# Patient Record
Sex: MALE | Race: WHITE | Employment: OTHER | URBAN - METROPOLITAN AREA
[De-identification: names, ages, dates, MRNs, and addresses within clinical notes are randomized per-mention and may not be internally consistent; named-entity substitution may affect disease eponyms.]

---

## 2018-06-04 ENCOUNTER — APPOINTMENT (OUTPATIENT)
Dept: GENERAL RADIOLOGY | Age: 63
DRG: 682 | End: 2018-06-04
Payer: MEDICARE

## 2018-06-04 ENCOUNTER — HOSPITAL ENCOUNTER (INPATIENT)
Age: 63
LOS: 4 days | Discharge: PSYCHIATRIC OR SUBSTANCE USE FACILITY | DRG: 682 | End: 2018-06-08
Attending: EMERGENCY MEDICINE | Admitting: INTERNAL MEDICINE
Payer: MEDICARE

## 2018-06-04 ENCOUNTER — APPOINTMENT (OUTPATIENT)
Dept: CT IMAGING | Age: 63
DRG: 682 | End: 2018-06-04
Payer: MEDICARE

## 2018-06-04 ENCOUNTER — APPOINTMENT (OUTPATIENT)
Dept: ULTRASOUND IMAGING | Age: 63
DRG: 682 | End: 2018-06-04
Payer: MEDICARE

## 2018-06-04 DIAGNOSIS — N17.9 ACUTE KIDNEY INJURY (HCC): ICD-10-CM

## 2018-06-04 DIAGNOSIS — R41.82 ALTERED MENTAL STATUS, UNSPECIFIED ALTERED MENTAL STATUS TYPE: Primary | ICD-10-CM

## 2018-06-04 LAB
-: ABNORMAL
ABSOLUTE EOS #: 0 K/UL (ref 0–0.4)
ABSOLUTE EOS #: 0.1 K/UL (ref 0–0.4)
ABSOLUTE EOS #: 0.23 K/UL (ref 0–0.4)
ABSOLUTE IMMATURE GRANULOCYTE: ABNORMAL K/UL (ref 0–0.3)
ABSOLUTE LYMPH #: 1.73 K/UL (ref 1–4.8)
ABSOLUTE LYMPH #: 2.2 K/UL (ref 1–4.8)
ABSOLUTE LYMPH #: 3.48 K/UL (ref 1–4.8)
ABSOLUTE MONO #: 1.39 K/UL (ref 0.2–0.8)
ABSOLUTE MONO #: 1.4 K/UL (ref 0.2–0.8)
ABSOLUTE MONO #: 2 K/UL (ref 0.2–0.8)
ACETAMINOPHEN LEVEL: <10 UG/ML (ref 10–30)
AMMONIA: 39 UMOL/L (ref 16–60)
AMORPHOUS: ABNORMAL
AMPHETAMINE SCREEN URINE: NEGATIVE
ANION GAP SERPL CALCULATED.3IONS-SCNC: 20 MMOL/L (ref 9–17)
ANION GAP SERPL CALCULATED.3IONS-SCNC: 27 MMOL/L (ref 9–17)
ANION GAP SERPL CALCULATED.3IONS-SCNC: 34 MMOL/L (ref 9–17)
BACTERIA: ABNORMAL
BARBITURATE SCREEN URINE: NEGATIVE
BASOPHILS # BLD: 0 %
BASOPHILS # BLD: 0 % (ref 0–2)
BASOPHILS # BLD: 1 %
BASOPHILS ABSOLUTE: 0 K/UL (ref 0–0.2)
BASOPHILS ABSOLUTE: 0 K/UL (ref 0–0.2)
BASOPHILS ABSOLUTE: 0.13 K/UL (ref 0–0.2)
BENZODIAZEPINE SCREEN, URINE: NEGATIVE
BILIRUBIN URINE: ABNORMAL
BUN BLDV-MCNC: 28 MG/DL (ref 8–23)
BUN BLDV-MCNC: 34 MG/DL (ref 8–23)
BUN BLDV-MCNC: 34 MG/DL (ref 8–23)
BUN/CREAT BLD: 13 (ref 9–20)
BUN/CREAT BLD: 18 (ref 9–20)
BUN/CREAT BLD: 20 (ref 9–20)
BUPRENORPHINE URINE: NORMAL
CALCIUM SERPL-MCNC: 10.2 MG/DL (ref 8.6–10.4)
CALCIUM SERPL-MCNC: 8.8 MG/DL (ref 8.6–10.4)
CALCIUM SERPL-MCNC: 9 MG/DL (ref 8.6–10.4)
CANNABINOID SCREEN URINE: NEGATIVE
CASTS UA: ABNORMAL /LPF
CHLORIDE BLD-SCNC: 86 MMOL/L (ref 98–107)
CHLORIDE BLD-SCNC: 94 MMOL/L (ref 98–107)
CHLORIDE BLD-SCNC: 95 MMOL/L (ref 98–107)
CO2: 17 MMOL/L (ref 20–31)
CO2: 18 MMOL/L (ref 20–31)
CO2: 23 MMOL/L (ref 20–31)
COCAINE METABOLITE, URINE: NEGATIVE
COLOR: ABNORMAL
COMMENT UA: ABNORMAL
CREAT SERPL-MCNC: 1.37 MG/DL (ref 0.7–1.2)
CREAT SERPL-MCNC: 1.86 MG/DL (ref 0.7–1.2)
CREAT SERPL-MCNC: 2.57 MG/DL (ref 0.7–1.2)
CREATININE URINE: 292.4 MG/DL (ref 39–259)
CRYSTALS, UA: ABNORMAL /HPF
DIFFERENTIAL TYPE: ABNORMAL
EKG ATRIAL RATE: 87 BPM
EKG P AXIS: 76 DEGREES
EKG P-R INTERVAL: 152 MS
EKG Q-T INTERVAL: 372 MS
EKG QRS DURATION: 74 MS
EKG QTC CALCULATION (BAZETT): 447 MS
EKG R AXIS: 23 DEGREES
EKG T AXIS: 3 DEGREES
EKG VENTRICULAR RATE: 87 BPM
EOSINOPHILS RELATIVE PERCENT: 0 % (ref 1–4)
EOSINOPHILS RELATIVE PERCENT: 1 % (ref 1–4)
EOSINOPHILS RELATIVE PERCENT: 2 % (ref 1–4)
EPITHELIAL CELLS UA: ABNORMAL /HPF (ref 0–5)
ETHANOL PERCENT: <0.01 %
ETHANOL: <10 MG/DL
GFR AFRICAN AMERICAN: 31 ML/MIN
GFR AFRICAN AMERICAN: 45 ML/MIN
GFR AFRICAN AMERICAN: >60 ML/MIN
GFR NON-AFRICAN AMERICAN: 25 ML/MIN
GFR NON-AFRICAN AMERICAN: 37 ML/MIN
GFR NON-AFRICAN AMERICAN: 52 ML/MIN
GFR SERPL CREATININE-BSD FRML MDRD: ABNORMAL ML/MIN/{1.73_M2}
GLUCOSE BLD-MCNC: 111 MG/DL (ref 70–99)
GLUCOSE BLD-MCNC: 93 MG/DL (ref 70–99)
GLUCOSE BLD-MCNC: 98 MG/DL (ref 70–99)
GLUCOSE URINE: NEGATIVE
HCT VFR BLD CALC: 45.8 % (ref 41–53)
HCT VFR BLD CALC: 46.1 % (ref 41–53)
HCT VFR BLD CALC: 52.9 % (ref 41–53)
HEMOGLOBIN: 14.9 G/DL (ref 13.5–17.5)
HEMOGLOBIN: 15.8 G/DL (ref 13.5–17.5)
HEMOGLOBIN: 17.7 G/DL (ref 13.5–17.5)
IMMATURE GRANULOCYTES: ABNORMAL %
KETONES, URINE: ABNORMAL
LEUKOCYTE ESTERASE, URINE: NEGATIVE
LYMPHOCYTES # BLD: 13 % (ref 24–44)
LYMPHOCYTES # BLD: 26 % (ref 24–44)
LYMPHOCYTES # BLD: 30 % (ref 24–44)
MCH RBC QN AUTO: 30.6 PG (ref 26–34)
MCH RBC QN AUTO: 31.1 PG (ref 26–34)
MCH RBC QN AUTO: 31.8 PG (ref 26–34)
MCHC RBC AUTO-ENTMCNC: 32.5 G/DL (ref 31–37)
MCHC RBC AUTO-ENTMCNC: 33.4 G/DL (ref 31–37)
MCHC RBC AUTO-ENTMCNC: 34.3 G/DL (ref 31–37)
MCV RBC AUTO: 92.8 FL (ref 80–100)
MCV RBC AUTO: 93.2 FL (ref 80–100)
MCV RBC AUTO: 94.1 FL (ref 80–100)
MDMA URINE: NORMAL
METHADONE SCREEN, URINE: NEGATIVE
METHAMPHETAMINE, URINE: NORMAL
MONOCYTES # BLD: 12 % (ref 1–7)
MONOCYTES # BLD: 15 % (ref 1–7)
MONOCYTES # BLD: 16 % (ref 1–7)
MUCUS: ABNORMAL
NITRITE, URINE: NEGATIVE
NRBC AUTOMATED: ABNORMAL PER 100 WBC
OPIATES, URINE: NEGATIVE
OTHER OBSERVATIONS UA: ABNORMAL
OXYCODONE SCREEN URINE: NEGATIVE
PDW BLD-RTO: 12.1 % (ref 11.5–14.5)
PDW BLD-RTO: 12.7 % (ref 11.5–14.5)
PDW BLD-RTO: 13.1 % (ref 11.5–14.5)
PH UA: 6 (ref 5–8)
PHENCYCLIDINE, URINE: NEGATIVE
PLATELET # BLD: 175 K/UL (ref 130–400)
PLATELET # BLD: 187 K/UL (ref 130–400)
PLATELET # BLD: 187 K/UL (ref 130–400)
PLATELET ESTIMATE: ABNORMAL
PMV BLD AUTO: 11.1 FL (ref 6–12)
PMV BLD AUTO: ABNORMAL FL (ref 6–12)
PMV BLD AUTO: ABNORMAL FL (ref 6–12)
POTASSIUM SERPL-SCNC: 3.1 MMOL/L (ref 3.7–5.3)
POTASSIUM SERPL-SCNC: 3.3 MMOL/L (ref 3.7–5.3)
POTASSIUM SERPL-SCNC: 3.6 MMOL/L (ref 3.7–5.3)
PROPOXYPHENE, URINE: NORMAL
PROTEIN UA: ABNORMAL
RBC # BLD: 4.87 M/UL (ref 4.5–5.9)
RBC # BLD: 4.97 M/UL (ref 4.5–5.9)
RBC # BLD: 5.67 M/UL (ref 4.5–5.9)
RBC # BLD: ABNORMAL 10*6/UL
RBC UA: ABNORMAL /HPF (ref 0–2)
RENAL EPITHELIAL, UA: ABNORMAL /HPF
SALICYLATE LEVEL: <1 MG/DL (ref 3–10)
SEG NEUTROPHILS: 56 % (ref 36–66)
SEG NEUTROPHILS: 57 % (ref 36–66)
SEG NEUTROPHILS: 71 % (ref 36–66)
SEGMENTED NEUTROPHILS ABSOLUTE COUNT: 4.8 K/UL (ref 1.8–7.7)
SEGMENTED NEUTROPHILS ABSOLUTE COUNT: 6.5 K/UL (ref 1.8–7.7)
SEGMENTED NEUTROPHILS ABSOLUTE COUNT: 9.44 K/UL (ref 1.8–7.7)
SERUM OSMOLALITY: 293 MOSM/KG (ref 282–298)
SODIUM BLD-SCNC: 137 MMOL/L (ref 135–144)
SODIUM BLD-SCNC: 138 MMOL/L (ref 135–144)
SODIUM BLD-SCNC: 139 MMOL/L (ref 135–144)
SODIUM,UR: 21 MMOL/L
SPECIFIC GRAVITY UA: 1.03 (ref 1–1.03)
TEST INFORMATION: NORMAL
TOXIC TRICYCLIC SC,BLOOD: NEGATIVE
TRICHOMONAS: ABNORMAL
TRICYCLIC ANTIDEPRESSANTS, UR: NORMAL
TURBIDITY: CLEAR
URINE HGB: ABNORMAL
UROBILINOGEN, URINE: NORMAL
WBC # BLD: 11.6 K/UL (ref 3.5–11)
WBC # BLD: 13.3 K/UL (ref 3.5–11)
WBC # BLD: 8.5 K/UL (ref 3.5–11)
WBC # BLD: ABNORMAL 10*3/UL
WBC UA: ABNORMAL /HPF (ref 0–5)
YEAST: ABNORMAL

## 2018-06-04 PROCEDURE — 6360000002 HC RX W HCPCS: Performed by: INTERNAL MEDICINE

## 2018-06-04 PROCEDURE — 2580000003 HC RX 258: Performed by: INTERNAL MEDICINE

## 2018-06-04 PROCEDURE — 80307 DRUG TEST PRSMV CHEM ANLYZR: CPT

## 2018-06-04 PROCEDURE — 2000000000 HC ICU R&B

## 2018-06-04 PROCEDURE — 87086 URINE CULTURE/COLONY COUNT: CPT

## 2018-06-04 PROCEDURE — 2580000003 HC RX 258: Performed by: EMERGENCY MEDICINE

## 2018-06-04 PROCEDURE — 82570 ASSAY OF URINE CREATININE: CPT

## 2018-06-04 PROCEDURE — 36415 COLL VENOUS BLD VENIPUNCTURE: CPT

## 2018-06-04 PROCEDURE — 82140 ASSAY OF AMMONIA: CPT

## 2018-06-04 PROCEDURE — 85025 COMPLETE CBC W/AUTO DIFF WBC: CPT

## 2018-06-04 PROCEDURE — 87040 BLOOD CULTURE FOR BACTERIA: CPT

## 2018-06-04 PROCEDURE — G0480 DRUG TEST DEF 1-7 CLASSES: HCPCS

## 2018-06-04 PROCEDURE — 83930 ASSAY OF BLOOD OSMOLALITY: CPT

## 2018-06-04 PROCEDURE — 99285 EMERGENCY DEPT VISIT HI MDM: CPT

## 2018-06-04 PROCEDURE — 80048 BASIC METABOLIC PNL TOTAL CA: CPT

## 2018-06-04 PROCEDURE — 93005 ELECTROCARDIOGRAM TRACING: CPT

## 2018-06-04 PROCEDURE — 70450 CT HEAD/BRAIN W/O DYE: CPT

## 2018-06-04 PROCEDURE — 84300 ASSAY OF URINE SODIUM: CPT

## 2018-06-04 PROCEDURE — 76770 US EXAM ABDO BACK WALL COMP: CPT

## 2018-06-04 PROCEDURE — 71045 X-RAY EXAM CHEST 1 VIEW: CPT

## 2018-06-04 PROCEDURE — 2500000003 HC RX 250 WO HCPCS: Performed by: SPECIALIST

## 2018-06-04 PROCEDURE — 81001 URINALYSIS AUTO W/SCOPE: CPT

## 2018-06-04 RX ORDER — POTASSIUM BICARBONATE 25 MEQ/1
25 TABLET, EFFERVESCENT ORAL ONCE
Status: DISCONTINUED | OUTPATIENT
Start: 2018-06-04 | End: 2018-06-04

## 2018-06-04 RX ORDER — HEPARIN SODIUM 5000 [USP'U]/ML
5000 INJECTION, SOLUTION INTRAVENOUS; SUBCUTANEOUS EVERY 8 HOURS SCHEDULED
Status: DISCONTINUED | OUTPATIENT
Start: 2018-06-04 | End: 2018-06-08 | Stop reason: HOSPADM

## 2018-06-04 RX ORDER — POTASSIUM CHLORIDE 7.45 MG/ML
10 INJECTION INTRAVENOUS
Status: COMPLETED | OUTPATIENT
Start: 2018-06-04 | End: 2018-06-04

## 2018-06-04 RX ORDER — SODIUM CHLORIDE 9 MG/ML
INJECTION, SOLUTION INTRAVENOUS CONTINUOUS
Status: DISCONTINUED | OUTPATIENT
Start: 2018-06-04 | End: 2018-06-04

## 2018-06-04 RX ORDER — TOPIRAMATE 100 MG/1
300 TABLET, FILM COATED ORAL DAILY
Status: ON HOLD | COMMUNITY
End: 2018-06-08 | Stop reason: HOSPADM

## 2018-06-04 RX ORDER — SODIUM CHLORIDE 0.9 % (FLUSH) 0.9 %
10 SYRINGE (ML) INJECTION EVERY 12 HOURS SCHEDULED
Status: DISCONTINUED | OUTPATIENT
Start: 2018-06-04 | End: 2018-06-08 | Stop reason: HOSPADM

## 2018-06-04 RX ORDER — SODIUM CHLORIDE 0.9 % (FLUSH) 0.9 %
10 SYRINGE (ML) INJECTION PRN
Status: DISCONTINUED | OUTPATIENT
Start: 2018-06-04 | End: 2018-06-08 | Stop reason: HOSPADM

## 2018-06-04 RX ORDER — LORAZEPAM 2 MG/ML
0.5 INJECTION INTRAMUSCULAR EVERY 6 HOURS PRN
Status: DISCONTINUED | OUTPATIENT
Start: 2018-06-04 | End: 2018-06-08 | Stop reason: HOSPADM

## 2018-06-04 RX ORDER — 0.9 % SODIUM CHLORIDE 0.9 %
1000 INTRAVENOUS SOLUTION INTRAVENOUS ONCE
Status: COMPLETED | OUTPATIENT
Start: 2018-06-04 | End: 2018-06-04

## 2018-06-04 RX ADMIN — SODIUM CHLORIDE: 9 INJECTION, SOLUTION INTRAVENOUS at 05:30

## 2018-06-04 RX ADMIN — SODIUM CHLORIDE 1000 ML: 9 INJECTION, SOLUTION INTRAVENOUS at 02:00

## 2018-06-04 RX ADMIN — POTASSIUM CHLORIDE 10 MEQ: 10 INJECTION, SOLUTION INTRAVENOUS at 15:46

## 2018-06-04 RX ADMIN — Medication: at 14:57

## 2018-06-04 RX ADMIN — POTASSIUM CHLORIDE 10 MEQ: 10 INJECTION, SOLUTION INTRAVENOUS at 14:06

## 2018-06-04 RX ADMIN — Medication: at 14:55

## 2018-06-04 RX ADMIN — LORAZEPAM 0.5 MG: 2 INJECTION INTRAMUSCULAR; INTRAVENOUS at 03:53

## 2018-06-05 ENCOUNTER — APPOINTMENT (OUTPATIENT)
Dept: MRI IMAGING | Age: 63
DRG: 682 | End: 2018-06-05
Payer: MEDICARE

## 2018-06-05 PROBLEM — H10.32 ACUTE BACTERIAL CONJUNCTIVITIS OF LEFT EYE: Status: ACTIVE | Noted: 2018-06-05

## 2018-06-05 PROBLEM — E83.39 HYPOPHOSPHATEMIA: Status: ACTIVE | Noted: 2018-06-05

## 2018-06-05 PROBLEM — E87.6 HYPOKALEMIA: Status: ACTIVE | Noted: 2018-06-05

## 2018-06-05 LAB
ABSOLUTE EOS #: 0.1 K/UL (ref 0–0.4)
ABSOLUTE IMMATURE GRANULOCYTE: ABNORMAL K/UL (ref 0–0.3)
ABSOLUTE LYMPH #: 1.8 K/UL (ref 1–4.8)
ABSOLUTE MONO #: 1.4 K/UL (ref 0.2–0.8)
ANION GAP SERPL CALCULATED.3IONS-SCNC: 15 MMOL/L (ref 9–17)
ANION GAP SERPL CALCULATED.3IONS-SCNC: 15 MMOL/L (ref 9–17)
BASOPHILS # BLD: 1 % (ref 0–2)
BASOPHILS ABSOLUTE: 0 K/UL (ref 0–0.2)
BUN BLDV-MCNC: 20 MG/DL (ref 8–23)
BUN/CREAT BLD: 19 (ref 9–20)
CALCIUM SERPL-MCNC: 8.7 MG/DL (ref 8.6–10.4)
CHLORIDE BLD-SCNC: 99 MMOL/L (ref 98–107)
CHLORIDE BLD-SCNC: 99 MMOL/L (ref 98–107)
CO2: 27 MMOL/L (ref 20–31)
CO2: 27 MMOL/L (ref 20–31)
CREAT SERPL-MCNC: 1.05 MG/DL (ref 0.7–1.2)
CULTURE: NO GROWTH
CULTURE: NORMAL
DIFFERENTIAL TYPE: ABNORMAL
EOSINOPHILS RELATIVE PERCENT: 2 % (ref 1–4)
FOLATE: 6.9 NG/ML
GFR AFRICAN AMERICAN: >60 ML/MIN
GFR NON-AFRICAN AMERICAN: >60 ML/MIN
GFR SERPL CREATININE-BSD FRML MDRD: ABNORMAL ML/MIN/{1.73_M2}
GFR SERPL CREATININE-BSD FRML MDRD: ABNORMAL ML/MIN/{1.73_M2}
GLUCOSE BLD-MCNC: 127 MG/DL (ref 70–99)
HCT VFR BLD CALC: 45.5 % (ref 41–53)
HEMOGLOBIN: 15.1 G/DL (ref 13.5–17.5)
IMMATURE GRANULOCYTES: ABNORMAL %
LYMPHOCYTES # BLD: 24 % (ref 24–44)
Lab: NORMAL
MAGNESIUM: 2 MG/DL (ref 1.6–2.6)
MCH RBC QN AUTO: 31.1 PG (ref 26–34)
MCHC RBC AUTO-ENTMCNC: 33.2 G/DL (ref 31–37)
MCV RBC AUTO: 93.6 FL (ref 80–100)
MONOCYTES # BLD: 19 % (ref 1–7)
NRBC AUTOMATED: ABNORMAL PER 100 WBC
PDW BLD-RTO: 12.8 % (ref 11.5–14.5)
PHOSPHORUS: 2 MG/DL (ref 2.5–4.5)
PLATELET # BLD: 162 K/UL (ref 130–400)
PLATELET ESTIMATE: ABNORMAL
PMV BLD AUTO: 12.4 FL (ref 6–12)
POTASSIUM SERPL-SCNC: 3.3 MMOL/L (ref 3.7–5.3)
POTASSIUM SERPL-SCNC: 3.5 MMOL/L (ref 3.7–5.3)
POTASSIUM SERPL-SCNC: 3.8 MMOL/L (ref 3.7–5.3)
RBC # BLD: 4.86 M/UL (ref 4.5–5.9)
RBC # BLD: ABNORMAL 10*6/UL
SEG NEUTROPHILS: 54 % (ref 36–66)
SEGMENTED NEUTROPHILS ABSOLUTE COUNT: 4.2 K/UL (ref 1.8–7.7)
SODIUM BLD-SCNC: 141 MMOL/L (ref 135–144)
SODIUM BLD-SCNC: 141 MMOL/L (ref 135–144)
SPECIMEN DESCRIPTION: NORMAL
SPECIMEN DESCRIPTION: NORMAL
STATUS: NORMAL
TSH SERPL DL<=0.05 MIU/L-ACNC: 2.11 MIU/L (ref 0.3–5)
VITAMIN B-12: 1778 PG/ML (ref 232–1245)
WBC # BLD: 7.5 K/UL (ref 3.5–11)
WBC # BLD: ABNORMAL 10*3/UL

## 2018-06-05 PROCEDURE — 36415 COLL VENOUS BLD VENIPUNCTURE: CPT

## 2018-06-05 PROCEDURE — 6360000002 HC RX W HCPCS: Performed by: INTERNAL MEDICINE

## 2018-06-05 PROCEDURE — 2500000003 HC RX 250 WO HCPCS: Performed by: SPECIALIST

## 2018-06-05 PROCEDURE — 6370000000 HC RX 637 (ALT 250 FOR IP): Performed by: INTERNAL MEDICINE

## 2018-06-05 PROCEDURE — 80051 ELECTROLYTE PANEL: CPT

## 2018-06-05 PROCEDURE — 82607 VITAMIN B-12: CPT

## 2018-06-05 PROCEDURE — 82746 ASSAY OF FOLIC ACID SERUM: CPT

## 2018-06-05 PROCEDURE — 83735 ASSAY OF MAGNESIUM: CPT

## 2018-06-05 PROCEDURE — 84443 ASSAY THYROID STIM HORMONE: CPT

## 2018-06-05 PROCEDURE — 95816 EEG AWAKE AND DROWSY: CPT

## 2018-06-05 PROCEDURE — 84132 ASSAY OF SERUM POTASSIUM: CPT

## 2018-06-05 PROCEDURE — 2000000000 HC ICU R&B

## 2018-06-05 PROCEDURE — 84100 ASSAY OF PHOSPHORUS: CPT

## 2018-06-05 PROCEDURE — 2580000003 HC RX 258: Performed by: INTERNAL MEDICINE

## 2018-06-05 PROCEDURE — 6360000002 HC RX W HCPCS: Performed by: SPECIALIST

## 2018-06-05 PROCEDURE — 85025 COMPLETE CBC W/AUTO DIFF WBC: CPT

## 2018-06-05 PROCEDURE — 80048 BASIC METABOLIC PNL TOTAL CA: CPT

## 2018-06-05 RX ORDER — SENNA AND DOCUSATE SODIUM 50; 8.6 MG/1; MG/1
1 TABLET, FILM COATED ORAL NIGHTLY PRN
Status: ON HOLD | COMMUNITY
End: 2018-06-08 | Stop reason: HOSPADM

## 2018-06-05 RX ORDER — DEXTROSE, SODIUM CHLORIDE, AND POTASSIUM CHLORIDE 5; .45; .15 G/100ML; G/100ML; G/100ML
INJECTION INTRAVENOUS CONTINUOUS
Status: DISCONTINUED | OUTPATIENT
Start: 2018-06-05 | End: 2018-06-08 | Stop reason: HOSPADM

## 2018-06-05 RX ORDER — CIPROFLOXACIN HYDROCHLORIDE 3.5 MG/ML
2 SOLUTION/ DROPS TOPICAL 4 TIMES DAILY
Status: DISCONTINUED | OUTPATIENT
Start: 2018-06-05 | End: 2018-06-08 | Stop reason: HOSPADM

## 2018-06-05 RX ORDER — MELOXICAM 7.5 MG/1
7.5 TABLET ORAL DAILY
Status: ON HOLD | COMMUNITY
End: 2018-06-08 | Stop reason: HOSPADM

## 2018-06-05 RX ORDER — OLANZAPINE 10 MG/1
10 TABLET ORAL NIGHTLY
Status: ON HOLD | COMMUNITY
End: 2018-06-08 | Stop reason: HOSPADM

## 2018-06-05 RX ORDER — POTASSIUM CHLORIDE 7.45 MG/ML
10 INJECTION INTRAVENOUS
Status: COMPLETED | OUTPATIENT
Start: 2018-06-05 | End: 2018-06-05

## 2018-06-05 RX ADMIN — Medication: at 00:42

## 2018-06-05 RX ADMIN — POTASSIUM CHLORIDE 10 MEQ: 10 INJECTION, SOLUTION INTRAVENOUS at 14:48

## 2018-06-05 RX ADMIN — POTASSIUM CHLORIDE 10 MEQ: 10 INJECTION, SOLUTION INTRAVENOUS at 16:10

## 2018-06-05 RX ADMIN — POTASSIUM CHLORIDE 10 MEQ: 10 INJECTION, SOLUTION INTRAVENOUS at 08:55

## 2018-06-05 RX ADMIN — POTASSIUM CHLORIDE 10 MEQ: 10 INJECTION, SOLUTION INTRAVENOUS at 10:11

## 2018-06-05 RX ADMIN — CEFTRIAXONE SODIUM 1 G: 1 INJECTION, POWDER, FOR SOLUTION INTRAMUSCULAR; INTRAVENOUS at 21:33

## 2018-06-05 RX ADMIN — CIPROFLOXACIN HYDROCHLORIDE 2 DROP: 3 SOLUTION/ DROPS OPHTHALMIC at 21:34

## 2018-06-05 RX ADMIN — POTASSIUM CHLORIDE 10 MEQ: 10 INJECTION, SOLUTION INTRAVENOUS at 18:28

## 2018-06-05 RX ADMIN — POTASSIUM CHLORIDE, DEXTROSE MONOHYDRATE AND SODIUM CHLORIDE: 150; 5; 450 INJECTION, SOLUTION INTRAVENOUS at 15:13

## 2018-06-05 RX ADMIN — POTASSIUM CHLORIDE 10 MEQ: 10 INJECTION, SOLUTION INTRAVENOUS at 17:25

## 2018-06-06 LAB
ABSOLUTE EOS #: 0.1 K/UL (ref 0–0.4)
ABSOLUTE IMMATURE GRANULOCYTE: ABNORMAL K/UL (ref 0–0.3)
ABSOLUTE LYMPH #: 2 K/UL (ref 1–4.8)
ABSOLUTE MONO #: 1.7 K/UL (ref 0.2–0.8)
ANION GAP SERPL CALCULATED.3IONS-SCNC: 10 MMOL/L (ref 9–17)
BASOPHILS # BLD: 1 % (ref 0–2)
BASOPHILS ABSOLUTE: 0.1 K/UL (ref 0–0.2)
BUN BLDV-MCNC: 8 MG/DL (ref 8–23)
BUN/CREAT BLD: 9 (ref 9–20)
CALCIUM SERPL-MCNC: 8.8 MG/DL (ref 8.6–10.4)
CHLORIDE BLD-SCNC: 100 MMOL/L (ref 98–107)
CO2: 31 MMOL/L (ref 20–31)
CREAT SERPL-MCNC: 0.89 MG/DL (ref 0.7–1.2)
DIFFERENTIAL TYPE: ABNORMAL
EOSINOPHILS RELATIVE PERCENT: 1 % (ref 1–4)
GFR AFRICAN AMERICAN: >60 ML/MIN
GFR NON-AFRICAN AMERICAN: >60 ML/MIN
GFR SERPL CREATININE-BSD FRML MDRD: ABNORMAL ML/MIN/{1.73_M2}
GFR SERPL CREATININE-BSD FRML MDRD: ABNORMAL ML/MIN/{1.73_M2}
GLUCOSE BLD-MCNC: 120 MG/DL (ref 70–99)
HCT VFR BLD CALC: 47.4 % (ref 41–53)
HEMOGLOBIN: 15.7 G/DL (ref 13.5–17.5)
IMMATURE GRANULOCYTES: ABNORMAL %
LYMPHOCYTES # BLD: 21 % (ref 24–44)
MCH RBC QN AUTO: 31.1 PG (ref 26–34)
MCHC RBC AUTO-ENTMCNC: 33.1 G/DL (ref 31–37)
MCV RBC AUTO: 93.9 FL (ref 80–100)
MONOCYTES # BLD: 19 % (ref 1–7)
NRBC AUTOMATED: ABNORMAL PER 100 WBC
PDW BLD-RTO: 13 % (ref 11.5–14.5)
PLATELET # BLD: 173 K/UL (ref 130–400)
PLATELET ESTIMATE: ABNORMAL
PMV BLD AUTO: 11.3 FL (ref 6–12)
POTASSIUM SERPL-SCNC: 3.7 MMOL/L (ref 3.7–5.3)
RBC # BLD: 5.05 M/UL (ref 4.5–5.9)
RBC # BLD: ABNORMAL 10*6/UL
SEG NEUTROPHILS: 58 % (ref 36–66)
SEGMENTED NEUTROPHILS ABSOLUTE COUNT: 5.5 K/UL (ref 1.8–7.7)
SODIUM BLD-SCNC: 141 MMOL/L (ref 135–144)
WBC # BLD: 9.4 K/UL (ref 3.5–11)
WBC # BLD: ABNORMAL 10*3/UL

## 2018-06-06 PROCEDURE — 6360000002 HC RX W HCPCS: Performed by: INTERNAL MEDICINE

## 2018-06-06 PROCEDURE — 2500000003 HC RX 250 WO HCPCS: Performed by: SPECIALIST

## 2018-06-06 PROCEDURE — 85025 COMPLETE CBC W/AUTO DIFF WBC: CPT

## 2018-06-06 PROCEDURE — 36415 COLL VENOUS BLD VENIPUNCTURE: CPT

## 2018-06-06 PROCEDURE — 80048 BASIC METABOLIC PNL TOTAL CA: CPT

## 2018-06-06 PROCEDURE — 2060000000 HC ICU INTERMEDIATE R&B

## 2018-06-06 PROCEDURE — 2580000003 HC RX 258: Performed by: INTERNAL MEDICINE

## 2018-06-06 RX ADMIN — HEPARIN SODIUM 5000 UNITS: 5000 INJECTION, SOLUTION INTRAVENOUS; SUBCUTANEOUS at 19:19

## 2018-06-06 RX ADMIN — HEPARIN SODIUM 5000 UNITS: 5000 INJECTION, SOLUTION INTRAVENOUS; SUBCUTANEOUS at 21:23

## 2018-06-06 RX ADMIN — POTASSIUM CHLORIDE, DEXTROSE MONOHYDRATE AND SODIUM CHLORIDE: 150; 5; 450 INJECTION, SOLUTION INTRAVENOUS at 05:24

## 2018-06-06 RX ADMIN — CIPROFLOXACIN HYDROCHLORIDE 2 DROP: 3 SOLUTION/ DROPS OPHTHALMIC at 12:00

## 2018-06-06 RX ADMIN — CIPROFLOXACIN HYDROCHLORIDE 2 DROP: 3 SOLUTION/ DROPS OPHTHALMIC at 08:00

## 2018-06-06 RX ADMIN — HEPARIN SODIUM 5000 UNITS: 5000 INJECTION, SOLUTION INTRAVENOUS; SUBCUTANEOUS at 05:50

## 2018-06-06 RX ADMIN — POTASSIUM CHLORIDE, DEXTROSE MONOHYDRATE AND SODIUM CHLORIDE: 150; 5; 450 INJECTION, SOLUTION INTRAVENOUS at 19:40

## 2018-06-06 RX ADMIN — CIPROFLOXACIN HYDROCHLORIDE 2 DROP: 3 SOLUTION/ DROPS OPHTHALMIC at 16:00

## 2018-06-06 RX ADMIN — CIPROFLOXACIN HYDROCHLORIDE 2 DROP: 3 SOLUTION/ DROPS OPHTHALMIC at 21:33

## 2018-06-06 RX ADMIN — CEFTRIAXONE SODIUM 1 G: 1 INJECTION, POWDER, FOR SOLUTION INTRAMUSCULAR; INTRAVENOUS at 21:26

## 2018-06-07 ENCOUNTER — APPOINTMENT (OUTPATIENT)
Dept: GENERAL RADIOLOGY | Age: 63
DRG: 682 | End: 2018-06-07
Payer: MEDICARE

## 2018-06-07 ENCOUNTER — APPOINTMENT (OUTPATIENT)
Dept: MRI IMAGING | Age: 63
DRG: 682 | End: 2018-06-07
Payer: MEDICARE

## 2018-06-07 LAB
ABSOLUTE EOS #: 0.2 K/UL (ref 0–0.4)
ABSOLUTE IMMATURE GRANULOCYTE: ABNORMAL K/UL (ref 0–0.3)
ABSOLUTE LYMPH #: 2.2 K/UL (ref 1–4.8)
ABSOLUTE MONO #: 1.3 K/UL (ref 0.2–0.8)
ANION GAP SERPL CALCULATED.3IONS-SCNC: 12 MMOL/L (ref 9–17)
BASOPHILS # BLD: 0 % (ref 0–2)
BASOPHILS ABSOLUTE: 0 K/UL (ref 0–0.2)
BUN BLDV-MCNC: 5 MG/DL (ref 8–23)
BUN/CREAT BLD: 7 (ref 9–20)
CALCIUM SERPL-MCNC: 9.1 MG/DL (ref 8.6–10.4)
CHLORIDE BLD-SCNC: 99 MMOL/L (ref 98–107)
CO2: 29 MMOL/L (ref 20–31)
CREAT SERPL-MCNC: 0.72 MG/DL (ref 0.7–1.2)
DIFFERENTIAL TYPE: ABNORMAL
EOSINOPHILS RELATIVE PERCENT: 3 % (ref 1–4)
GFR AFRICAN AMERICAN: >60 ML/MIN
GFR NON-AFRICAN AMERICAN: >60 ML/MIN
GFR SERPL CREATININE-BSD FRML MDRD: ABNORMAL ML/MIN/{1.73_M2}
GFR SERPL CREATININE-BSD FRML MDRD: ABNORMAL ML/MIN/{1.73_M2}
GLUCOSE BLD-MCNC: 103 MG/DL (ref 75–110)
GLUCOSE BLD-MCNC: 106 MG/DL (ref 70–99)
HCT VFR BLD CALC: 50.3 % (ref 41–53)
HEMOGLOBIN: 16.5 G/DL (ref 13.5–17.5)
IMMATURE GRANULOCYTES: ABNORMAL %
LYMPHOCYTES # BLD: 31 % (ref 24–44)
MCH RBC QN AUTO: 31 PG (ref 26–34)
MCHC RBC AUTO-ENTMCNC: 32.9 G/DL (ref 31–37)
MCV RBC AUTO: 94.3 FL (ref 80–100)
MONOCYTES # BLD: 18 % (ref 1–7)
NRBC AUTOMATED: ABNORMAL PER 100 WBC
PDW BLD-RTO: 13.1 % (ref 11.5–14.5)
PLATELET # BLD: 172 K/UL (ref 130–400)
PLATELET ESTIMATE: ABNORMAL
PMV BLD AUTO: 11.4 FL (ref 6–12)
POTASSIUM SERPL-SCNC: 3.7 MMOL/L (ref 3.7–5.3)
RBC # BLD: 5.34 M/UL (ref 4.5–5.9)
RBC # BLD: ABNORMAL 10*6/UL
SEG NEUTROPHILS: 48 % (ref 36–66)
SEGMENTED NEUTROPHILS ABSOLUTE COUNT: 3.5 K/UL (ref 1.8–7.7)
SODIUM BLD-SCNC: 140 MMOL/L (ref 135–144)
WBC # BLD: 7.2 K/UL (ref 3.5–11)
WBC # BLD: ABNORMAL 10*3/UL

## 2018-06-07 PROCEDURE — 85025 COMPLETE CBC W/AUTO DIFF WBC: CPT

## 2018-06-07 PROCEDURE — 80048 BASIC METABOLIC PNL TOTAL CA: CPT

## 2018-06-07 PROCEDURE — 2500000003 HC RX 250 WO HCPCS: Performed by: SPECIALIST

## 2018-06-07 PROCEDURE — 82947 ASSAY GLUCOSE BLOOD QUANT: CPT

## 2018-06-07 PROCEDURE — 36415 COLL VENOUS BLD VENIPUNCTURE: CPT

## 2018-06-07 PROCEDURE — 2060000000 HC ICU INTERMEDIATE R&B

## 2018-06-07 PROCEDURE — 6360000004 HC RX CONTRAST MEDICATION: Performed by: PSYCHIATRY & NEUROLOGY

## 2018-06-07 PROCEDURE — 6360000002 HC RX W HCPCS: Performed by: INTERNAL MEDICINE

## 2018-06-07 PROCEDURE — 2580000003 HC RX 258: Performed by: PSYCHIATRY & NEUROLOGY

## 2018-06-07 PROCEDURE — 70553 MRI BRAIN STEM W/O & W/DYE: CPT

## 2018-06-07 PROCEDURE — 2580000003 HC RX 258: Performed by: INTERNAL MEDICINE

## 2018-06-07 PROCEDURE — A9579 GAD-BASE MR CONTRAST NOS,1ML: HCPCS | Performed by: PSYCHIATRY & NEUROLOGY

## 2018-06-07 PROCEDURE — 71045 X-RAY EXAM CHEST 1 VIEW: CPT

## 2018-06-07 RX ORDER — SODIUM CHLORIDE 0.9 % (FLUSH) 0.9 %
10 SYRINGE (ML) INJECTION
Status: COMPLETED | OUTPATIENT
Start: 2018-06-07 | End: 2018-06-07

## 2018-06-07 RX ADMIN — CIPROFLOXACIN HYDROCHLORIDE 2 DROP: 3 SOLUTION/ DROPS OPHTHALMIC at 16:06

## 2018-06-07 RX ADMIN — HEPARIN SODIUM 5000 UNITS: 5000 INJECTION, SOLUTION INTRAVENOUS; SUBCUTANEOUS at 13:18

## 2018-06-07 RX ADMIN — POTASSIUM CHLORIDE, DEXTROSE MONOHYDRATE AND SODIUM CHLORIDE: 150; 5; 450 INJECTION, SOLUTION INTRAVENOUS at 10:07

## 2018-06-07 RX ADMIN — HEPARIN SODIUM 5000 UNITS: 5000 INJECTION, SOLUTION INTRAVENOUS; SUBCUTANEOUS at 05:29

## 2018-06-07 RX ADMIN — HEPARIN SODIUM 5000 UNITS: 5000 INJECTION, SOLUTION INTRAVENOUS; SUBCUTANEOUS at 22:53

## 2018-06-07 RX ADMIN — GADOTERIDOL 19 ML: 279.3 INJECTION, SOLUTION INTRAVENOUS at 08:40

## 2018-06-07 RX ADMIN — Medication 10 ML: at 08:40

## 2018-06-07 RX ADMIN — Medication 10 ML: at 10:07

## 2018-06-07 RX ADMIN — POTASSIUM CHLORIDE, DEXTROSE MONOHYDRATE AND SODIUM CHLORIDE: 150; 5; 450 INJECTION, SOLUTION INTRAVENOUS at 22:53

## 2018-06-07 RX ADMIN — CIPROFLOXACIN HYDROCHLORIDE 2 DROP: 3 SOLUTION/ DROPS OPHTHALMIC at 13:19

## 2018-06-07 RX ADMIN — CIPROFLOXACIN HYDROCHLORIDE 2 DROP: 3 SOLUTION/ DROPS OPHTHALMIC at 08:05

## 2018-06-07 RX ADMIN — CIPROFLOXACIN HYDROCHLORIDE 2 DROP: 3 SOLUTION/ DROPS OPHTHALMIC at 20:58

## 2018-06-07 ASSESSMENT — PAIN SCALES - WONG BAKER
WONGBAKER_NUMERICALRESPONSE: 0

## 2018-06-08 ENCOUNTER — HOSPITAL ENCOUNTER (INPATIENT)
Age: 63
LOS: 5 days | DRG: 885 | End: 2018-06-13
Attending: PSYCHIATRY & NEUROLOGY | Admitting: PSYCHIATRY & NEUROLOGY
Payer: MEDICARE

## 2018-06-08 VITALS
HEIGHT: 72 IN | DIASTOLIC BLOOD PRESSURE: 59 MMHG | RESPIRATION RATE: 16 BRPM | BODY MASS INDEX: 27.05 KG/M2 | WEIGHT: 199.7 LBS | SYSTOLIC BLOOD PRESSURE: 100 MMHG | OXYGEN SATURATION: 97 % | TEMPERATURE: 98.4 F | HEART RATE: 71 BPM

## 2018-06-08 PROBLEM — F31.9 BIPOLAR 1 DISORDER (HCC): Status: RESOLVED | Noted: 2018-06-08 | Resolved: 2018-06-08

## 2018-06-08 PROBLEM — F31.9 BIPOLAR 1 DISORDER (HCC): Status: ACTIVE | Noted: 2018-06-08

## 2018-06-08 PROBLEM — F25.0 SCHIZOAFFECTIVE DISORDER, BIPOLAR TYPE (HCC): Status: ACTIVE | Noted: 2018-06-08

## 2018-06-08 LAB
ABSOLUTE EOS #: 0.2 K/UL (ref 0–0.4)
ABSOLUTE IMMATURE GRANULOCYTE: ABNORMAL K/UL (ref 0–0.3)
ABSOLUTE LYMPH #: 2.8 K/UL (ref 1–4.8)
ABSOLUTE MONO #: 1.3 K/UL (ref 0.2–0.8)
ANION GAP SERPL CALCULATED.3IONS-SCNC: 14 MMOL/L (ref 9–17)
BASOPHILS # BLD: 0 % (ref 0–2)
BASOPHILS ABSOLUTE: 0 K/UL (ref 0–0.2)
BUN BLDV-MCNC: 4 MG/DL (ref 8–23)
BUN/CREAT BLD: 5 (ref 9–20)
CALCIUM SERPL-MCNC: 9 MG/DL (ref 8.6–10.4)
CHLORIDE BLD-SCNC: 98 MMOL/L (ref 98–107)
CO2: 23 MMOL/L (ref 20–31)
CREAT SERPL-MCNC: 0.8 MG/DL (ref 0.7–1.2)
DIFFERENTIAL TYPE: ABNORMAL
EOSINOPHILS RELATIVE PERCENT: 3 % (ref 1–4)
GFR AFRICAN AMERICAN: >60 ML/MIN
GFR NON-AFRICAN AMERICAN: >60 ML/MIN
GFR SERPL CREATININE-BSD FRML MDRD: ABNORMAL ML/MIN/{1.73_M2}
GFR SERPL CREATININE-BSD FRML MDRD: ABNORMAL ML/MIN/{1.73_M2}
GLUCOSE BLD-MCNC: 115 MG/DL (ref 70–99)
HCT VFR BLD CALC: 51.3 % (ref 41–53)
HEMOGLOBIN: 17.1 G/DL (ref 13.5–17.5)
IMMATURE GRANULOCYTES: ABNORMAL %
LYMPHOCYTES # BLD: 37 % (ref 24–44)
MCH RBC QN AUTO: 31.4 PG (ref 26–34)
MCHC RBC AUTO-ENTMCNC: 33.4 G/DL (ref 31–37)
MCV RBC AUTO: 94.2 FL (ref 80–100)
MONOCYTES # BLD: 17 % (ref 1–7)
NRBC AUTOMATED: ABNORMAL PER 100 WBC
PDW BLD-RTO: 13 % (ref 11.5–14.5)
PLATELET # BLD: 193 K/UL (ref 130–400)
PLATELET ESTIMATE: ABNORMAL
PMV BLD AUTO: 10.8 FL (ref 6–12)
POTASSIUM SERPL-SCNC: 4.2 MMOL/L (ref 3.7–5.3)
RBC # BLD: 5.44 M/UL (ref 4.5–5.9)
RBC # BLD: ABNORMAL 10*6/UL
SEG NEUTROPHILS: 43 % (ref 36–66)
SEGMENTED NEUTROPHILS ABSOLUTE COUNT: 3.2 K/UL (ref 1.8–7.7)
SODIUM BLD-SCNC: 135 MMOL/L (ref 135–144)
WBC # BLD: 7.5 K/UL (ref 3.5–11)
WBC # BLD: ABNORMAL 10*3/UL

## 2018-06-08 PROCEDURE — 80048 BASIC METABOLIC PNL TOTAL CA: CPT

## 2018-06-08 PROCEDURE — 6370000000 HC RX 637 (ALT 250 FOR IP): Performed by: PSYCHIATRY & NEUROLOGY

## 2018-06-08 PROCEDURE — 36415 COLL VENOUS BLD VENIPUNCTURE: CPT

## 2018-06-08 PROCEDURE — 85025 COMPLETE CBC W/AUTO DIFF WBC: CPT

## 2018-06-08 PROCEDURE — 1240000000 HC EMOTIONAL WELLNESS R&B

## 2018-06-08 PROCEDURE — 6360000002 HC RX W HCPCS: Performed by: INTERNAL MEDICINE

## 2018-06-08 RX ORDER — OLANZAPINE 5 MG/1
5 TABLET, ORALLY DISINTEGRATING ORAL 3 TIMES DAILY PRN
Status: DISCONTINUED | OUTPATIENT
Start: 2018-06-08 | End: 2018-06-13 | Stop reason: HOSPADM

## 2018-06-08 RX ORDER — BENZTROPINE MESYLATE 1 MG/ML
2 INJECTION INTRAMUSCULAR; INTRAVENOUS 2 TIMES DAILY PRN
Status: DISCONTINUED | OUTPATIENT
Start: 2018-06-08 | End: 2018-06-13 | Stop reason: HOSPADM

## 2018-06-08 RX ORDER — ACETAMINOPHEN 325 MG/1
650 TABLET ORAL EVERY 4 HOURS PRN
Status: DISCONTINUED | OUTPATIENT
Start: 2018-06-08 | End: 2018-06-13 | Stop reason: HOSPADM

## 2018-06-08 RX ORDER — HYDROXYZINE HYDROCHLORIDE 25 MG/1
50 TABLET, FILM COATED ORAL 3 TIMES DAILY PRN
Status: DISCONTINUED | OUTPATIENT
Start: 2018-06-08 | End: 2018-06-13 | Stop reason: HOSPADM

## 2018-06-08 RX ORDER — OLANZAPINE 10 MG/1
5 INJECTION, POWDER, LYOPHILIZED, FOR SOLUTION INTRAMUSCULAR 3 TIMES DAILY PRN
Status: DISCONTINUED | OUTPATIENT
Start: 2018-06-08 | End: 2018-06-13 | Stop reason: SDUPTHER

## 2018-06-08 RX ORDER — TRAZODONE HYDROCHLORIDE 50 MG/1
50 TABLET ORAL NIGHTLY PRN
Status: DISCONTINUED | OUTPATIENT
Start: 2018-06-08 | End: 2018-06-13 | Stop reason: HOSPADM

## 2018-06-08 RX ORDER — CIPROFLOXACIN HYDROCHLORIDE 3.5 MG/ML
2 SOLUTION/ DROPS TOPICAL 4 TIMES DAILY
Status: ACTIVE | OUTPATIENT
Start: 2018-06-08 | End: 2018-06-12

## 2018-06-08 RX ORDER — MAGNESIUM HYDROXIDE/ALUMINUM HYDROXICE/SIMETHICONE 120; 1200; 1200 MG/30ML; MG/30ML; MG/30ML
30 SUSPENSION ORAL EVERY 6 HOURS PRN
Status: DISCONTINUED | OUTPATIENT
Start: 2018-06-08 | End: 2018-06-13 | Stop reason: HOSPADM

## 2018-06-08 RX ORDER — NICOTINE 21 MG/24HR
1 PATCH, TRANSDERMAL 24 HOURS TRANSDERMAL DAILY
Status: DISCONTINUED | OUTPATIENT
Start: 2018-06-08 | End: 2018-06-13 | Stop reason: HOSPADM

## 2018-06-08 RX ADMIN — HEPARIN SODIUM 5000 UNITS: 5000 INJECTION, SOLUTION INTRAVENOUS; SUBCUTANEOUS at 05:54

## 2018-06-08 RX ADMIN — OLANZAPINE 5 MG: 5 TABLET, ORALLY DISINTEGRATING ORAL at 19:48

## 2018-06-08 RX ADMIN — LORAZEPAM 0.5 MG: 2 INJECTION INTRAMUSCULAR; INTRAVENOUS at 06:09

## 2018-06-08 ASSESSMENT — PAIN SCALES - GENERAL
PAINLEVEL_OUTOF10: 0
PAINLEVEL_OUTOF10: 0

## 2018-06-08 ASSESSMENT — SLEEP AND FATIGUE QUESTIONNAIRES
DO YOU HAVE DIFFICULTY SLEEPING: NO
DO YOU USE A SLEEP AID: NO

## 2018-06-08 ASSESSMENT — LIFESTYLE VARIABLES: HISTORY_ALCOHOL_USE: NO

## 2018-06-09 LAB
ALBUMIN SERPL-MCNC: 3.3 G/DL (ref 3.5–5.2)
ALBUMIN/GLOBULIN RATIO: ABNORMAL (ref 1–2.5)
ALP BLD-CCNC: 57 U/L (ref 40–129)
ALT SERPL-CCNC: 19 U/L (ref 5–41)
ANION GAP SERPL CALCULATED.3IONS-SCNC: 10 MMOL/L (ref 9–17)
AST SERPL-CCNC: 30 U/L
BILIRUB SERPL-MCNC: 0.85 MG/DL (ref 0.3–1.2)
BUN BLDV-MCNC: 10 MG/DL (ref 8–23)
BUN/CREAT BLD: ABNORMAL (ref 9–20)
CALCIUM SERPL-MCNC: 9.3 MG/DL (ref 8.6–10.4)
CHLORIDE BLD-SCNC: 100 MMOL/L (ref 98–107)
CHOLESTEROL/HDL RATIO: 4.4
CHOLESTEROL: 119 MG/DL
CO2: 27 MMOL/L (ref 20–31)
CREAT SERPL-MCNC: 1.13 MG/DL (ref 0.7–1.2)
GFR AFRICAN AMERICAN: >60 ML/MIN
GFR NON-AFRICAN AMERICAN: >60 ML/MIN
GFR SERPL CREATININE-BSD FRML MDRD: ABNORMAL ML/MIN/{1.73_M2}
GFR SERPL CREATININE-BSD FRML MDRD: ABNORMAL ML/MIN/{1.73_M2}
GLUCOSE BLD-MCNC: 110 MG/DL (ref 70–99)
HDLC SERPL-MCNC: 27 MG/DL
LDL CHOLESTEROL: 60 MG/DL (ref 0–130)
POTASSIUM SERPL-SCNC: 4.2 MMOL/L (ref 3.7–5.3)
SODIUM BLD-SCNC: 137 MMOL/L (ref 135–144)
THYROXINE, FREE: 1.68 NG/DL (ref 0.93–1.7)
TOTAL PROTEIN: 6.3 G/DL (ref 6.4–8.3)
TRIGL SERPL-MCNC: 159 MG/DL
TSH SERPL DL<=0.05 MIU/L-ACNC: 2.52 MIU/L (ref 0.3–5)
VLDLC SERPL CALC-MCNC: ABNORMAL MG/DL (ref 1–30)

## 2018-06-09 PROCEDURE — 36415 COLL VENOUS BLD VENIPUNCTURE: CPT

## 2018-06-09 PROCEDURE — 80053 COMPREHEN METABOLIC PANEL: CPT

## 2018-06-09 PROCEDURE — 83036 HEMOGLOBIN GLYCOSYLATED A1C: CPT

## 2018-06-09 PROCEDURE — 84439 ASSAY OF FREE THYROXINE: CPT

## 2018-06-09 PROCEDURE — 90792 PSYCH DIAG EVAL W/MED SRVCS: CPT | Performed by: PSYCHIATRY & NEUROLOGY

## 2018-06-09 PROCEDURE — 80061 LIPID PANEL: CPT

## 2018-06-09 PROCEDURE — 84443 ASSAY THYROID STIM HORMONE: CPT

## 2018-06-09 PROCEDURE — 1240000000 HC EMOTIONAL WELLNESS R&B

## 2018-06-09 RX ORDER — OLANZAPINE 5 MG/1
5 TABLET, ORALLY DISINTEGRATING ORAL NIGHTLY
Status: DISCONTINUED | OUTPATIENT
Start: 2018-06-09 | End: 2018-06-11

## 2018-06-09 ASSESSMENT — PAIN SCALES - GENERAL: PAINLEVEL_OUTOF10: 0

## 2018-06-10 LAB
CULTURE: NORMAL
ESTIMATED AVERAGE GLUCOSE: 100 MG/DL
HBA1C MFR BLD: 5.1 % (ref 4–6)
Lab: NORMAL
SPECIMEN DESCRIPTION: NORMAL
STATUS: NORMAL
STATUS: NORMAL

## 2018-06-10 PROCEDURE — 6370000000 HC RX 637 (ALT 250 FOR IP): Performed by: PSYCHIATRY & NEUROLOGY

## 2018-06-10 PROCEDURE — 99222 1ST HOSP IP/OBS MODERATE 55: CPT | Performed by: INTERNAL MEDICINE

## 2018-06-10 PROCEDURE — 1240000000 HC EMOTIONAL WELLNESS R&B

## 2018-06-10 RX ADMIN — CIPROFLOXACIN HYDROCHLORIDE 2 DROP: 3.5 SOLUTION/ DROPS TOPICAL at 16:47

## 2018-06-10 RX ADMIN — OLANZAPINE 5 MG: 5 TABLET, ORALLY DISINTEGRATING ORAL at 22:11

## 2018-06-10 RX ADMIN — OLANZAPINE 5 MG: 5 TABLET, ORALLY DISINTEGRATING ORAL at 09:56

## 2018-06-10 RX ADMIN — CIPROFLOXACIN HYDROCHLORIDE 2 DROP: 3.5 SOLUTION/ DROPS TOPICAL at 09:54

## 2018-06-10 RX ADMIN — CIPROFLOXACIN HYDROCHLORIDE 2 DROP: 3.5 SOLUTION/ DROPS TOPICAL at 22:11

## 2018-06-10 ASSESSMENT — PAIN SCALES - GENERAL: PAINLEVEL_OUTOF10: 0

## 2018-06-11 PROCEDURE — 99232 SBSQ HOSP IP/OBS MODERATE 35: CPT | Performed by: NURSE PRACTITIONER

## 2018-06-11 PROCEDURE — 6370000000 HC RX 637 (ALT 250 FOR IP): Performed by: PSYCHIATRY & NEUROLOGY

## 2018-06-11 PROCEDURE — 99231 SBSQ HOSP IP/OBS SF/LOW 25: CPT | Performed by: INTERNAL MEDICINE

## 2018-06-11 PROCEDURE — 1240000000 HC EMOTIONAL WELLNESS R&B

## 2018-06-11 PROCEDURE — 6370000000 HC RX 637 (ALT 250 FOR IP): Performed by: NURSE PRACTITIONER

## 2018-06-11 RX ORDER — OLANZAPINE 10 MG/1
10 TABLET, ORALLY DISINTEGRATING ORAL NIGHTLY
Status: DISCONTINUED | OUTPATIENT
Start: 2018-06-11 | End: 2018-06-13 | Stop reason: HOSPADM

## 2018-06-11 RX ADMIN — CIPROFLOXACIN HYDROCHLORIDE 2 DROP: 3.5 SOLUTION/ DROPS TOPICAL at 09:53

## 2018-06-11 RX ADMIN — CIPROFLOXACIN HYDROCHLORIDE 2 DROP: 3.5 SOLUTION/ DROPS TOPICAL at 21:41

## 2018-06-11 RX ADMIN — CIPROFLOXACIN HYDROCHLORIDE 2 DROP: 3.5 SOLUTION/ DROPS TOPICAL at 18:52

## 2018-06-11 RX ADMIN — OLANZAPINE 10 MG: 10 TABLET, ORALLY DISINTEGRATING ORAL at 21:41

## 2018-06-11 RX ADMIN — TRAZODONE HYDROCHLORIDE 50 MG: 50 TABLET ORAL at 21:41

## 2018-06-11 RX ADMIN — HYDROXYZINE HYDROCHLORIDE 50 MG: 25 TABLET, FILM COATED ORAL at 21:41

## 2018-06-12 PROCEDURE — 99232 SBSQ HOSP IP/OBS MODERATE 35: CPT | Performed by: REGISTERED NURSE

## 2018-06-12 PROCEDURE — 1240000000 HC EMOTIONAL WELLNESS R&B

## 2018-06-12 RX ADMIN — CIPROFLOXACIN HYDROCHLORIDE 2 DROP: 3.5 SOLUTION/ DROPS TOPICAL at 08:29

## 2018-06-12 ASSESSMENT — PAIN SCALES - WONG BAKER: WONGBAKER_NUMERICALRESPONSE: 0

## 2018-06-13 ENCOUNTER — HOSPITAL ENCOUNTER (INPATIENT)
Age: 63
LOS: 1 days | Discharge: NURSING, REHAB OR LONG-TERM CARE | DRG: 683 | End: 2018-06-15
Attending: INTERNAL MEDICINE | Admitting: INTERNAL MEDICINE
Payer: MEDICARE

## 2018-06-13 VITALS
DIASTOLIC BLOOD PRESSURE: 99 MMHG | WEIGHT: 199 LBS | SYSTOLIC BLOOD PRESSURE: 153 MMHG | OXYGEN SATURATION: 97 % | RESPIRATION RATE: 18 BRPM | HEART RATE: 94 BPM | TEMPERATURE: 97.2 F | BODY MASS INDEX: 26.95 KG/M2 | HEIGHT: 72 IN

## 2018-06-13 LAB
ANION GAP SERPL CALCULATED.3IONS-SCNC: 21 MMOL/L (ref 9–17)
BUN BLDV-MCNC: 27 MG/DL (ref 8–23)
BUN/CREAT BLD: ABNORMAL (ref 9–20)
CALCIUM SERPL-MCNC: 9.4 MG/DL (ref 8.6–10.4)
CHLORIDE BLD-SCNC: 98 MMOL/L (ref 98–107)
CO2: 22 MMOL/L (ref 20–31)
CREAT SERPL-MCNC: 1.49 MG/DL (ref 0.7–1.2)
GFR AFRICAN AMERICAN: 58 ML/MIN
GFR NON-AFRICAN AMERICAN: 48 ML/MIN
GFR SERPL CREATININE-BSD FRML MDRD: ABNORMAL ML/MIN/{1.73_M2}
GFR SERPL CREATININE-BSD FRML MDRD: ABNORMAL ML/MIN/{1.73_M2}
GLUCOSE BLD-MCNC: 82 MG/DL (ref 70–99)
POTASSIUM SERPL-SCNC: 4.4 MMOL/L (ref 3.7–5.3)
SODIUM BLD-SCNC: 141 MMOL/L (ref 135–144)

## 2018-06-13 PROCEDURE — 6360000002 HC RX W HCPCS: Performed by: STUDENT IN AN ORGANIZED HEALTH CARE EDUCATION/TRAINING PROGRAM

## 2018-06-13 PROCEDURE — G0378 HOSPITAL OBSERVATION PER HR: HCPCS

## 2018-06-13 PROCEDURE — 99238 HOSP IP/OBS DSCHRG MGMT 30/<: CPT | Performed by: REGISTERED NURSE

## 2018-06-13 PROCEDURE — 36415 COLL VENOUS BLD VENIPUNCTURE: CPT

## 2018-06-13 PROCEDURE — 99231 SBSQ HOSP IP/OBS SF/LOW 25: CPT | Performed by: INTERNAL MEDICINE

## 2018-06-13 PROCEDURE — 80048 BASIC METABOLIC PNL TOTAL CA: CPT

## 2018-06-13 PROCEDURE — 2500000003 HC RX 250 WO HCPCS: Performed by: STUDENT IN AN ORGANIZED HEALTH CARE EDUCATION/TRAINING PROGRAM

## 2018-06-13 PROCEDURE — S0028 INJECTION, FAMOTIDINE, 20 MG: HCPCS | Performed by: STUDENT IN AN ORGANIZED HEALTH CARE EDUCATION/TRAINING PROGRAM

## 2018-06-13 PROCEDURE — 2580000003 HC RX 258: Performed by: STUDENT IN AN ORGANIZED HEALTH CARE EDUCATION/TRAINING PROGRAM

## 2018-06-13 PROCEDURE — 94664 DEMO&/EVAL PT USE INHALER: CPT

## 2018-06-13 PROCEDURE — 99220 PR INITIAL OBSERVATION CARE/DAY 70 MINUTES: CPT | Performed by: INTERNAL MEDICINE

## 2018-06-13 PROCEDURE — 6370000000 HC RX 637 (ALT 250 FOR IP): Performed by: HOSPITALIST

## 2018-06-13 PROCEDURE — 6370000000 HC RX 637 (ALT 250 FOR IP): Performed by: PSYCHIATRY & NEUROLOGY

## 2018-06-13 RX ORDER — ONDANSETRON 2 MG/ML
4 INJECTION INTRAMUSCULAR; INTRAVENOUS EVERY 6 HOURS PRN
Status: DISCONTINUED | OUTPATIENT
Start: 2018-06-13 | End: 2018-06-15 | Stop reason: HOSPADM

## 2018-06-13 RX ORDER — POTASSIUM CHLORIDE 20MEQ/15ML
40 LIQUID (ML) ORAL PRN
Status: DISCONTINUED | OUTPATIENT
Start: 2018-06-13 | End: 2018-06-15 | Stop reason: HOSPADM

## 2018-06-13 RX ORDER — SODIUM CHLORIDE 0.9 % (FLUSH) 0.9 %
10 SYRINGE (ML) INJECTION EVERY 12 HOURS SCHEDULED
Status: DISCONTINUED | OUTPATIENT
Start: 2018-06-13 | End: 2018-06-15 | Stop reason: HOSPADM

## 2018-06-13 RX ORDER — OLANZAPINE 10 MG/1
10 TABLET, ORALLY DISINTEGRATING ORAL NIGHTLY
Status: DISCONTINUED | OUTPATIENT
Start: 2018-06-13 | End: 2018-06-15 | Stop reason: HOSPADM

## 2018-06-13 RX ORDER — DOCUSATE SODIUM 100 MG/1
100 CAPSULE, LIQUID FILLED ORAL 2 TIMES DAILY PRN
Status: DISCONTINUED | OUTPATIENT
Start: 2018-06-13 | End: 2018-06-15 | Stop reason: HOSPADM

## 2018-06-13 RX ORDER — OLANZAPINE 10 MG/1
10 TABLET, ORALLY DISINTEGRATING ORAL NIGHTLY
Qty: 30 TABLET | Refills: 3 | Status: ON HOLD
Start: 2018-06-13 | End: 2022-01-04 | Stop reason: ALTCHOICE

## 2018-06-13 RX ORDER — POTASSIUM CHLORIDE 7.45 MG/ML
10 INJECTION INTRAVENOUS PRN
Status: DISCONTINUED | OUTPATIENT
Start: 2018-06-13 | End: 2018-06-15 | Stop reason: HOSPADM

## 2018-06-13 RX ORDER — HEPARIN SODIUM 5000 [USP'U]/ML
5000 INJECTION, SOLUTION INTRAVENOUS; SUBCUTANEOUS EVERY 8 HOURS SCHEDULED
Status: DISCONTINUED | OUTPATIENT
Start: 2018-06-13 | End: 2018-06-15 | Stop reason: HOSPADM

## 2018-06-13 RX ORDER — SODIUM CHLORIDE 0.9 % (FLUSH) 0.9 %
10 SYRINGE (ML) INJECTION PRN
Status: DISCONTINUED | OUTPATIENT
Start: 2018-06-13 | End: 2018-06-15 | Stop reason: HOSPADM

## 2018-06-13 RX ORDER — SODIUM CHLORIDE 9 MG/ML
INJECTION, SOLUTION INTRAVENOUS CONTINUOUS
Status: DISCONTINUED | OUTPATIENT
Start: 2018-06-13 | End: 2018-06-15 | Stop reason: HOSPADM

## 2018-06-13 RX ORDER — OLANZAPINE 5 MG/1
5 TABLET, ORALLY DISINTEGRATING ORAL 3 TIMES DAILY PRN
Qty: 30 TABLET | Refills: 3 | Status: ON HOLD
Start: 2018-06-13 | End: 2022-01-04 | Stop reason: ALTCHOICE

## 2018-06-13 RX ORDER — OLANZAPINE 5 MG/1
5 TABLET, ORALLY DISINTEGRATING ORAL 3 TIMES DAILY PRN
Status: DISCONTINUED | OUTPATIENT
Start: 2018-06-13 | End: 2018-06-15 | Stop reason: HOSPADM

## 2018-06-13 RX ORDER — POTASSIUM CHLORIDE 20 MEQ/1
40 TABLET, EXTENDED RELEASE ORAL PRN
Status: DISCONTINUED | OUTPATIENT
Start: 2018-06-13 | End: 2018-06-15 | Stop reason: HOSPADM

## 2018-06-13 RX ORDER — OLANZAPINE 10 MG/1
5 INJECTION, POWDER, LYOPHILIZED, FOR SOLUTION INTRAMUSCULAR 3 TIMES DAILY PRN
Status: DISCONTINUED | OUTPATIENT
Start: 2018-06-13 | End: 2018-06-13 | Stop reason: HOSPADM

## 2018-06-13 RX ORDER — LORAZEPAM 1 MG/1
1 TABLET ORAL 3 TIMES DAILY
Status: DISCONTINUED | OUTPATIENT
Start: 2018-06-13 | End: 2018-06-13 | Stop reason: HOSPADM

## 2018-06-13 RX ADMIN — Medication 10 ML: at 20:58

## 2018-06-13 RX ADMIN — FAMOTIDINE 20 MG: 10 INJECTION INTRAVENOUS at 20:57

## 2018-06-13 RX ADMIN — OLANZAPINE 10 MG: 10 TABLET, ORALLY DISINTEGRATING ORAL at 20:58

## 2018-06-13 RX ADMIN — HEPARIN SODIUM 5000 UNITS: 5000 INJECTION, SOLUTION INTRAVENOUS; SUBCUTANEOUS at 20:57

## 2018-06-13 ASSESSMENT — PAIN SCALES - GENERAL: PAINLEVEL_OUTOF10: 0

## 2018-06-14 PROBLEM — N17.9 ACUTE RENAL FAILURE (ARF) (HCC): Status: ACTIVE | Noted: 2018-06-14

## 2018-06-14 LAB
ABSOLUTE EOS #: 0.1 K/UL (ref 0–0.4)
ABSOLUTE IMMATURE GRANULOCYTE: ABNORMAL K/UL (ref 0–0.3)
ABSOLUTE LYMPH #: 3.2 K/UL (ref 1–4.8)
ABSOLUTE MONO #: 1.3 K/UL (ref 0.1–1.3)
ANION GAP SERPL CALCULATED.3IONS-SCNC: 22 MMOL/L (ref 9–17)
BASOPHILS # BLD: 1 % (ref 0–2)
BASOPHILS ABSOLUTE: 0.1 K/UL (ref 0–0.2)
BUN BLDV-MCNC: 23 MG/DL (ref 8–23)
BUN/CREAT BLD: ABNORMAL (ref 9–20)
CALCIUM SERPL-MCNC: 9.2 MG/DL (ref 8.6–10.4)
CHLORIDE BLD-SCNC: 100 MMOL/L (ref 98–107)
CO2: 19 MMOL/L (ref 20–31)
CREAT SERPL-MCNC: 1.26 MG/DL (ref 0.7–1.2)
DIFFERENTIAL TYPE: ABNORMAL
EOSINOPHILS RELATIVE PERCENT: 1 % (ref 0–4)
GFR AFRICAN AMERICAN: >60 ML/MIN
GFR NON-AFRICAN AMERICAN: 58 ML/MIN
GFR SERPL CREATININE-BSD FRML MDRD: ABNORMAL ML/MIN/{1.73_M2}
GFR SERPL CREATININE-BSD FRML MDRD: ABNORMAL ML/MIN/{1.73_M2}
GLUCOSE BLD-MCNC: 83 MG/DL (ref 70–99)
HCT VFR BLD CALC: 47.4 % (ref 41–53)
HEMOGLOBIN: 16.1 G/DL (ref 13.5–17.5)
IMMATURE GRANULOCYTES: ABNORMAL %
LYMPHOCYTES # BLD: 34 % (ref 24–44)
MCH RBC QN AUTO: 31.4 PG (ref 26–34)
MCHC RBC AUTO-ENTMCNC: 34 G/DL (ref 31–37)
MCV RBC AUTO: 92.5 FL (ref 80–100)
MONOCYTES # BLD: 14 % (ref 1–7)
NRBC AUTOMATED: ABNORMAL PER 100 WBC
PDW BLD-RTO: 13.6 % (ref 11.5–14.9)
PLATELET # BLD: 285 K/UL (ref 150–450)
PLATELET ESTIMATE: ABNORMAL
PMV BLD AUTO: 9.9 FL (ref 6–12)
POTASSIUM SERPL-SCNC: 4 MMOL/L (ref 3.7–5.3)
RBC # BLD: 5.13 M/UL (ref 4.5–5.9)
RBC # BLD: ABNORMAL 10*6/UL
SEG NEUTROPHILS: 50 % (ref 36–66)
SEGMENTED NEUTROPHILS ABSOLUTE COUNT: 4.7 K/UL (ref 1.3–9.1)
SODIUM BLD-SCNC: 141 MMOL/L (ref 135–144)
WBC # BLD: 9.5 K/UL (ref 3.5–11)
WBC # BLD: ABNORMAL 10*3/UL

## 2018-06-14 PROCEDURE — 80048 BASIC METABOLIC PNL TOTAL CA: CPT

## 2018-06-14 PROCEDURE — G8978 MOBILITY CURRENT STATUS: HCPCS

## 2018-06-14 PROCEDURE — 2500000003 HC RX 250 WO HCPCS: Performed by: STUDENT IN AN ORGANIZED HEALTH CARE EDUCATION/TRAINING PROGRAM

## 2018-06-14 PROCEDURE — 1200000000 HC SEMI PRIVATE

## 2018-06-14 PROCEDURE — 99232 SBSQ HOSP IP/OBS MODERATE 35: CPT | Performed by: INTERNAL MEDICINE

## 2018-06-14 PROCEDURE — 6360000002 HC RX W HCPCS: Performed by: STUDENT IN AN ORGANIZED HEALTH CARE EDUCATION/TRAINING PROGRAM

## 2018-06-14 PROCEDURE — 6370000000 HC RX 637 (ALT 250 FOR IP): Performed by: HOSPITALIST

## 2018-06-14 PROCEDURE — G8979 MOBILITY GOAL STATUS: HCPCS

## 2018-06-14 PROCEDURE — 2580000003 HC RX 258: Performed by: HOSPITALIST

## 2018-06-14 PROCEDURE — 36415 COLL VENOUS BLD VENIPUNCTURE: CPT

## 2018-06-14 PROCEDURE — S0028 INJECTION, FAMOTIDINE, 20 MG: HCPCS | Performed by: STUDENT IN AN ORGANIZED HEALTH CARE EDUCATION/TRAINING PROGRAM

## 2018-06-14 PROCEDURE — 90792 PSYCH DIAG EVAL W/MED SRVCS: CPT | Performed by: NURSE PRACTITIONER

## 2018-06-14 PROCEDURE — 97162 PT EVAL MOD COMPLEX 30 MIN: CPT

## 2018-06-14 PROCEDURE — 85025 COMPLETE CBC W/AUTO DIFF WBC: CPT

## 2018-06-14 RX ADMIN — OLANZAPINE 10 MG: 10 TABLET, ORALLY DISINTEGRATING ORAL at 21:55

## 2018-06-14 RX ADMIN — HEPARIN SODIUM 5000 UNITS: 5000 INJECTION, SOLUTION INTRAVENOUS; SUBCUTANEOUS at 06:32

## 2018-06-14 RX ADMIN — FAMOTIDINE 20 MG: 10 INJECTION INTRAVENOUS at 09:43

## 2018-06-14 RX ADMIN — SODIUM CHLORIDE: 9 INJECTION, SOLUTION INTRAVENOUS at 21:00

## 2018-06-14 RX ADMIN — SODIUM CHLORIDE: 9 INJECTION, SOLUTION INTRAVENOUS at 03:04

## 2018-06-14 RX ADMIN — OLANZAPINE 5 MG: 5 TABLET, ORALLY DISINTEGRATING ORAL at 14:38

## 2018-06-14 RX ADMIN — SODIUM CHLORIDE: 9 INJECTION, SOLUTION INTRAVENOUS at 12:17

## 2018-06-14 RX ADMIN — FAMOTIDINE 20 MG: 10 INJECTION INTRAVENOUS at 21:55

## 2018-06-14 ASSESSMENT — PAIN SCALES - GENERAL
PAINLEVEL_OUTOF10: 0

## 2018-06-15 VITALS
RESPIRATION RATE: 18 BRPM | WEIGHT: 195.11 LBS | SYSTOLIC BLOOD PRESSURE: 134 MMHG | TEMPERATURE: 98.3 F | DIASTOLIC BLOOD PRESSURE: 83 MMHG | OXYGEN SATURATION: 96 % | HEART RATE: 69 BPM | BODY MASS INDEX: 26.43 KG/M2 | HEIGHT: 72 IN

## 2018-06-15 PROBLEM — E44.1 MILD MALNUTRITION (HCC): Status: ACTIVE | Noted: 2018-06-15

## 2018-06-15 LAB
-: ABNORMAL
ABSOLUTE EOS #: 0 K/UL (ref 0–0.4)
ABSOLUTE IMMATURE GRANULOCYTE: ABNORMAL K/UL (ref 0–0.3)
ABSOLUTE LYMPH #: 2.27 K/UL (ref 1–4.8)
ABSOLUTE MONO #: 0.78 K/UL (ref 0.1–1.3)
AMORPHOUS: ABNORMAL
ANION GAP SERPL CALCULATED.3IONS-SCNC: 18 MMOL/L (ref 9–17)
BACTERIA: ABNORMAL
BASOPHILS # BLD: 0 % (ref 0–2)
BASOPHILS ABSOLUTE: 0 K/UL (ref 0–0.2)
BILIRUBIN URINE: ABNORMAL
BUN BLDV-MCNC: 14 MG/DL (ref 8–23)
BUN/CREAT BLD: ABNORMAL (ref 9–20)
CALCIUM SERPL-MCNC: 8.8 MG/DL (ref 8.6–10.4)
CASTS UA: ABNORMAL /LPF
CHLORIDE BLD-SCNC: 102 MMOL/L (ref 98–107)
CO2: 20 MMOL/L (ref 20–31)
COLOR: YELLOW
COMMENT UA: ABNORMAL
CREAT SERPL-MCNC: 1.04 MG/DL (ref 0.7–1.2)
CRYSTALS, UA: ABNORMAL /HPF
DIFFERENTIAL TYPE: ABNORMAL
EOSINOPHILS RELATIVE PERCENT: 0 % (ref 0–4)
EPITHELIAL CELLS UA: ABNORMAL /HPF
GFR AFRICAN AMERICAN: >60 ML/MIN
GFR NON-AFRICAN AMERICAN: >60 ML/MIN
GFR SERPL CREATININE-BSD FRML MDRD: ABNORMAL ML/MIN/{1.73_M2}
GFR SERPL CREATININE-BSD FRML MDRD: ABNORMAL ML/MIN/{1.73_M2}
GLUCOSE BLD-MCNC: 72 MG/DL (ref 70–99)
GLUCOSE URINE: ABNORMAL
HCT VFR BLD CALC: 42.8 % (ref 41–53)
HEMOGLOBIN: 14.6 G/DL (ref 13.5–17.5)
IMMATURE GRANULOCYTES: ABNORMAL %
KETONES, URINE: ABNORMAL
LEUKOCYTE ESTERASE, URINE: NEGATIVE
LYMPHOCYTES # BLD: 32 % (ref 24–44)
MCH RBC QN AUTO: 31.7 PG (ref 26–34)
MCHC RBC AUTO-ENTMCNC: 34.1 G/DL (ref 31–37)
MCV RBC AUTO: 92.8 FL (ref 80–100)
MONOCYTES # BLD: 11 % (ref 1–7)
MORPHOLOGY: NORMAL
MUCUS: ABNORMAL
NITRITE, URINE: NEGATIVE
NRBC AUTOMATED: ABNORMAL PER 100 WBC
OTHER OBSERVATIONS UA: ABNORMAL
PDW BLD-RTO: 13.5 % (ref 11.5–14.9)
PH UA: 5.5 (ref 5–8)
PLATELET # BLD: 257 K/UL (ref 150–450)
PLATELET ESTIMATE: ABNORMAL
PMV BLD AUTO: 9.4 FL (ref 6–12)
POTASSIUM SERPL-SCNC: 4.1 MMOL/L (ref 3.7–5.3)
PROTEIN UA: NEGATIVE
RBC # BLD: 4.61 M/UL (ref 4.5–5.9)
RBC # BLD: ABNORMAL 10*6/UL
RBC UA: ABNORMAL /HPF
RENAL EPITHELIAL, UA: ABNORMAL /HPF
SEG NEUTROPHILS: 57 % (ref 36–66)
SEGMENTED NEUTROPHILS ABSOLUTE COUNT: 4.05 K/UL (ref 1.3–9.1)
SODIUM BLD-SCNC: 140 MMOL/L (ref 135–144)
SPECIFIC GRAVITY UA: 1.02 (ref 1–1.03)
TRICHOMONAS: ABNORMAL
TURBIDITY: CLEAR
URINE HGB: NEGATIVE
UROBILINOGEN, URINE: NORMAL
WBC # BLD: 7.1 K/UL (ref 3.5–11)
WBC # BLD: ABNORMAL 10*3/UL
WBC UA: ABNORMAL /HPF
YEAST: ABNORMAL

## 2018-06-15 PROCEDURE — 85025 COMPLETE CBC W/AUTO DIFF WBC: CPT

## 2018-06-15 PROCEDURE — S0028 INJECTION, FAMOTIDINE, 20 MG: HCPCS | Performed by: STUDENT IN AN ORGANIZED HEALTH CARE EDUCATION/TRAINING PROGRAM

## 2018-06-15 PROCEDURE — 99239 HOSP IP/OBS DSCHRG MGMT >30: CPT | Performed by: INTERNAL MEDICINE

## 2018-06-15 PROCEDURE — 2580000003 HC RX 258: Performed by: HOSPITALIST

## 2018-06-15 PROCEDURE — 6370000000 HC RX 637 (ALT 250 FOR IP): Performed by: INTERNAL MEDICINE

## 2018-06-15 PROCEDURE — 36415 COLL VENOUS BLD VENIPUNCTURE: CPT

## 2018-06-15 PROCEDURE — 2500000003 HC RX 250 WO HCPCS: Performed by: STUDENT IN AN ORGANIZED HEALTH CARE EDUCATION/TRAINING PROGRAM

## 2018-06-15 PROCEDURE — 6360000002 HC RX W HCPCS: Performed by: STUDENT IN AN ORGANIZED HEALTH CARE EDUCATION/TRAINING PROGRAM

## 2018-06-15 PROCEDURE — 97530 THERAPEUTIC ACTIVITIES: CPT

## 2018-06-15 PROCEDURE — 81001 URINALYSIS AUTO W/SCOPE: CPT

## 2018-06-15 PROCEDURE — 80048 BASIC METABOLIC PNL TOTAL CA: CPT

## 2018-06-15 RX ORDER — TOPIRAMATE 100 MG/1
100 TABLET, FILM COATED ORAL DAILY
Qty: 60 TABLET | Refills: 3 | Status: ON HOLD | OUTPATIENT
Start: 2018-06-16 | End: 2022-01-04 | Stop reason: DRUGHIGH

## 2018-06-15 RX ORDER — TAMSULOSIN HYDROCHLORIDE 0.4 MG/1
0.4 CAPSULE ORAL DAILY
Status: DISCONTINUED | OUTPATIENT
Start: 2018-06-15 | End: 2018-06-15 | Stop reason: HOSPADM

## 2018-06-15 RX ORDER — TAMSULOSIN HYDROCHLORIDE 0.4 MG/1
0.4 CAPSULE ORAL DAILY
Qty: 30 CAPSULE | Refills: 3 | Status: SHIPPED | OUTPATIENT
Start: 2018-06-16

## 2018-06-15 RX ORDER — TOPIRAMATE 100 MG/1
100 TABLET, FILM COATED ORAL DAILY
Status: DISCONTINUED | OUTPATIENT
Start: 2018-06-15 | End: 2018-06-15 | Stop reason: HOSPADM

## 2018-06-15 RX ADMIN — TOPIRAMATE 100 MG: 100 TABLET, FILM COATED ORAL at 09:23

## 2018-06-15 RX ADMIN — HEPARIN SODIUM 5000 UNITS: 5000 INJECTION, SOLUTION INTRAVENOUS; SUBCUTANEOUS at 14:09

## 2018-06-15 RX ADMIN — HEPARIN SODIUM 5000 UNITS: 5000 INJECTION, SOLUTION INTRAVENOUS; SUBCUTANEOUS at 05:11

## 2018-06-15 RX ADMIN — SODIUM CHLORIDE: 9 INJECTION, SOLUTION INTRAVENOUS at 05:10

## 2018-06-15 RX ADMIN — TAMSULOSIN HYDROCHLORIDE 0.4 MG: 0.4 CAPSULE ORAL at 09:23

## 2018-06-15 RX ADMIN — FAMOTIDINE 20 MG: 10 INJECTION INTRAVENOUS at 09:23

## 2018-06-15 RX ADMIN — SODIUM CHLORIDE: 9 INJECTION, SOLUTION INTRAVENOUS at 14:13

## 2020-11-03 PROBLEM — N17.9 ACUTE RENAL FAILURE (HCC): Status: RESOLVED | Noted: 2020-11-03 | Resolved: 2020-11-03

## 2020-11-03 PROBLEM — N17.9 ACUTE KIDNEY INJURY (HCC): Status: RESOLVED | Noted: 2018-06-04 | Resolved: 2020-11-03

## 2021-11-19 ENCOUNTER — HOSPITAL ENCOUNTER (OUTPATIENT)
Dept: LAB | Age: 66
Setting detail: SPECIMEN
Discharge: HOME | End: 2021-11-19
Payer: MEDICARE

## 2021-11-19 DIAGNOSIS — Z01.818 PREOP TESTING: Primary | ICD-10-CM

## 2022-01-03 ENCOUNTER — APPOINTMENT (OUTPATIENT)
Dept: CT IMAGING | Age: 67
DRG: 871 | End: 2022-01-03
Payer: COMMERCIAL

## 2022-01-03 ENCOUNTER — HOSPITAL ENCOUNTER (INPATIENT)
Age: 67
LOS: 4 days | Discharge: NURSING, REHAB OR LONG-TERM CARE | DRG: 871 | End: 2022-01-07
Attending: EMERGENCY MEDICINE | Admitting: INTERNAL MEDICINE
Payer: COMMERCIAL

## 2022-01-03 ENCOUNTER — APPOINTMENT (OUTPATIENT)
Dept: GENERAL RADIOLOGY | Age: 67
DRG: 871 | End: 2022-01-03
Payer: COMMERCIAL

## 2022-01-03 DIAGNOSIS — J18.9 PNEUMONIA DUE TO INFECTIOUS ORGANISM, UNSPECIFIED LATERALITY, UNSPECIFIED PART OF LUNG: ICD-10-CM

## 2022-01-03 DIAGNOSIS — J18.9 PNEUMONIA OF LEFT UPPER LOBE DUE TO INFECTIOUS ORGANISM: Primary | ICD-10-CM

## 2022-01-03 DIAGNOSIS — M80.88XA PATHOLOGICAL FRACTURE OF THORACIC VERTEBRA DUE TO SECONDARY OSTEOPOROSIS (HCC): ICD-10-CM

## 2022-01-03 PROBLEM — I10 PRIMARY HYPERTENSION: Status: ACTIVE | Noted: 2022-01-03

## 2022-01-03 PROBLEM — E55.9 VITAMIN D DEFICIENCY: Status: ACTIVE | Noted: 2022-01-03

## 2022-01-03 PROBLEM — G62.9 NEUROPATHY: Status: ACTIVE | Noted: 2022-01-03

## 2022-01-03 PROBLEM — F31.9 BIPOLAR DISORDER (HCC): Status: ACTIVE | Noted: 2017-08-10

## 2022-01-03 PROBLEM — K21.9 GASTROESOPHAGEAL REFLUX DISEASE: Status: ACTIVE | Noted: 2022-01-03

## 2022-01-03 PROBLEM — N40.0 BENIGN PROSTATIC HYPERPLASIA: Status: ACTIVE | Noted: 2022-01-03

## 2022-01-03 PROBLEM — F25.9 SCHIZOAFFECTIVE DISORDER (HCC): Status: ACTIVE | Noted: 2018-06-08

## 2022-01-03 PROBLEM — R09.02 HYPOXIA: Status: ACTIVE | Noted: 2022-01-03

## 2022-01-03 PROBLEM — F31.10 BIPOLAR AFFECTIVE DISORDER, CURRENT EPISODE MANIC (HCC): Status: ACTIVE | Noted: 2022-01-03

## 2022-01-03 LAB
ABSOLUTE EOS #: 0.19 K/UL (ref 0–0.44)
ABSOLUTE IMMATURE GRANULOCYTE: 0.19 K/UL (ref 0–0.3)
ABSOLUTE LYMPH #: 3.3 K/UL (ref 1.1–3.7)
ABSOLUTE MONO #: 1.94 K/UL (ref 0.1–1.2)
ALBUMIN SERPL-MCNC: 4.2 G/DL (ref 3.5–5.2)
ALBUMIN/GLOBULIN RATIO: ABNORMAL (ref 1–2.5)
ALP BLD-CCNC: 80 U/L (ref 40–129)
ALT SERPL-CCNC: 24 U/L (ref 5–41)
ANION GAP SERPL CALCULATED.3IONS-SCNC: 13 MMOL/L (ref 9–17)
AST SERPL-CCNC: 19 U/L
BASOPHILS # BLD: 0 % (ref 0–2)
BASOPHILS ABSOLUTE: 0 K/UL (ref 0–0.2)
BILIRUB SERPL-MCNC: 0.85 MG/DL (ref 0.3–1.2)
BNP INTERPRETATION: ABNORMAL
BUN BLDV-MCNC: 14 MG/DL (ref 8–23)
BUN/CREAT BLD: 13 (ref 9–20)
CALCIUM SERPL-MCNC: 8.9 MG/DL (ref 8.6–10.4)
CHLORIDE BLD-SCNC: 107 MMOL/L (ref 98–107)
CO2: 21 MMOL/L (ref 20–31)
CREAT SERPL-MCNC: 1.09 MG/DL (ref 0.7–1.2)
DIFFERENTIAL TYPE: ABNORMAL
EOSINOPHILS RELATIVE PERCENT: 1 % (ref 1–4)
GFR AFRICAN AMERICAN: >60 ML/MIN
GFR NON-AFRICAN AMERICAN: >60 ML/MIN
GFR SERPL CREATININE-BSD FRML MDRD: ABNORMAL ML/MIN/{1.73_M2}
GFR SERPL CREATININE-BSD FRML MDRD: ABNORMAL ML/MIN/{1.73_M2}
GLUCOSE BLD-MCNC: 136 MG/DL (ref 70–99)
HCT VFR BLD CALC: 46.4 % (ref 40.7–50.3)
HEMOGLOBIN: 14.8 G/DL (ref 13–17)
IMMATURE GRANULOCYTES: 1 %
LACTIC ACID, SEPSIS WHOLE BLOOD: ABNORMAL MMOL/L (ref 0.5–1.9)
LACTIC ACID, SEPSIS WHOLE BLOOD: NORMAL MMOL/L (ref 0.5–1.9)
LACTIC ACID, SEPSIS: 1.3 MMOL/L (ref 0.5–1.9)
LACTIC ACID, SEPSIS: 2.7 MMOL/L (ref 0.5–1.9)
LYMPHOCYTES # BLD: 17 % (ref 24–43)
MCH RBC QN AUTO: 28.7 PG (ref 25.2–33.5)
MCHC RBC AUTO-ENTMCNC: 31.9 G/DL (ref 28.4–34.8)
MCV RBC AUTO: 90.1 FL (ref 82.6–102.9)
MONOCYTES # BLD: 10 % (ref 3–12)
MYOGLOBIN: 140 NG/ML (ref 28–72)
MYOGLOBIN: 240 NG/ML (ref 28–72)
MYOGLOBIN: 286 NG/ML (ref 28–72)
NRBC AUTOMATED: 0 PER 100 WBC
PDW BLD-RTO: 14.3 % (ref 11.8–14.4)
PLATELET # BLD: 272 K/UL (ref 138–453)
PLATELET ESTIMATE: ABNORMAL
PMV BLD AUTO: 11 FL (ref 8.1–13.5)
POTASSIUM SERPL-SCNC: 4.4 MMOL/L (ref 3.7–5.3)
PRO-BNP: 421 PG/ML
PROCALCITONIN: 0.1 NG/ML
RBC # BLD: 5.15 M/UL (ref 4.21–5.77)
RBC # BLD: ABNORMAL 10*6/UL
SARS-COV-2, RAPID: NOT DETECTED
SEG NEUTROPHILS: 71 % (ref 36–65)
SEGMENTED NEUTROPHILS ABSOLUTE COUNT: 13.78 K/UL (ref 1.5–8.1)
SODIUM BLD-SCNC: 141 MMOL/L (ref 135–144)
SPECIMEN DESCRIPTION: NORMAL
TOTAL PROTEIN: 7.8 G/DL (ref 6.4–8.3)
TROPONIN INTERP: ABNORMAL
TROPONIN T: ABNORMAL NG/ML
TROPONIN, HIGH SENSITIVITY: 22 NG/L (ref 0–22)
TROPONIN, HIGH SENSITIVITY: 23 NG/L (ref 0–22)
TROPONIN, HIGH SENSITIVITY: 25 NG/L (ref 0–22)
WBC # BLD: 19.4 K/UL (ref 3.5–11.3)
WBC # BLD: ABNORMAL 10*3/UL

## 2022-01-03 PROCEDURE — 71260 CT THORAX DX C+: CPT

## 2022-01-03 PROCEDURE — 87040 BLOOD CULTURE FOR BACTERIA: CPT

## 2022-01-03 PROCEDURE — 2700000000 HC OXYGEN THERAPY PER DAY

## 2022-01-03 PROCEDURE — 83874 ASSAY OF MYOGLOBIN: CPT

## 2022-01-03 PROCEDURE — 99285 EMERGENCY DEPT VISIT HI MDM: CPT

## 2022-01-03 PROCEDURE — 94660 CPAP INITIATION&MGMT: CPT

## 2022-01-03 PROCEDURE — 83880 ASSAY OF NATRIURETIC PEPTIDE: CPT

## 2022-01-03 PROCEDURE — 6360000004 HC RX CONTRAST MEDICATION: Performed by: EMERGENCY MEDICINE

## 2022-01-03 PROCEDURE — 99222 1ST HOSP IP/OBS MODERATE 55: CPT | Performed by: NURSE PRACTITIONER

## 2022-01-03 PROCEDURE — 83605 ASSAY OF LACTIC ACID: CPT

## 2022-01-03 PROCEDURE — 84484 ASSAY OF TROPONIN QUANT: CPT

## 2022-01-03 PROCEDURE — 6360000002 HC RX W HCPCS: Performed by: PHYSICIAN ASSISTANT

## 2022-01-03 PROCEDURE — 71045 X-RAY EXAM CHEST 1 VIEW: CPT

## 2022-01-03 PROCEDURE — 84145 PROCALCITONIN (PCT): CPT

## 2022-01-03 PROCEDURE — 36415 COLL VENOUS BLD VENIPUNCTURE: CPT

## 2022-01-03 PROCEDURE — 0202U NFCT DS 22 TRGT SARS-COV-2: CPT

## 2022-01-03 PROCEDURE — 2060000000 HC ICU INTERMEDIATE R&B

## 2022-01-03 PROCEDURE — 96365 THER/PROPH/DIAG IV INF INIT: CPT

## 2022-01-03 PROCEDURE — 96361 HYDRATE IV INFUSION ADD-ON: CPT

## 2022-01-03 PROCEDURE — 2580000003 HC RX 258: Performed by: EMERGENCY MEDICINE

## 2022-01-03 PROCEDURE — 85025 COMPLETE CBC W/AUTO DIFF WBC: CPT

## 2022-01-03 PROCEDURE — 2580000003 HC RX 258: Performed by: NURSE PRACTITIONER

## 2022-01-03 PROCEDURE — 94761 N-INVAS EAR/PLS OXIMETRY MLT: CPT

## 2022-01-03 PROCEDURE — 80053 COMPREHEN METABOLIC PANEL: CPT

## 2022-01-03 PROCEDURE — 87635 SARS-COV-2 COVID-19 AMP PRB: CPT

## 2022-01-03 PROCEDURE — 2580000003 HC RX 258: Performed by: PHYSICIAN ASSISTANT

## 2022-01-03 PROCEDURE — 93005 ELECTROCARDIOGRAM TRACING: CPT | Performed by: PHYSICIAN ASSISTANT

## 2022-01-03 PROCEDURE — 96367 TX/PROPH/DG ADDL SEQ IV INF: CPT

## 2022-01-03 PROCEDURE — 6370000000 HC RX 637 (ALT 250 FOR IP): Performed by: NURSE PRACTITIONER

## 2022-01-03 RX ORDER — PANTOPRAZOLE SODIUM 40 MG/1
40 TABLET, DELAYED RELEASE ORAL
Status: DISCONTINUED | OUTPATIENT
Start: 2022-01-04 | End: 2022-01-07 | Stop reason: HOSPADM

## 2022-01-03 RX ORDER — SODIUM CHLORIDE 0.9 % (FLUSH) 0.9 %
10 SYRINGE (ML) INJECTION PRN
Status: DISCONTINUED | OUTPATIENT
Start: 2022-01-03 | End: 2022-01-03

## 2022-01-03 RX ORDER — SODIUM CHLORIDE 9 MG/ML
INJECTION, SOLUTION INTRAVENOUS CONTINUOUS
Status: DISCONTINUED | OUTPATIENT
Start: 2022-01-03 | End: 2022-01-07

## 2022-01-03 RX ORDER — ALBUTEROL SULFATE 2.5 MG/3ML
2.5 SOLUTION RESPIRATORY (INHALATION)
Status: DISCONTINUED | OUTPATIENT
Start: 2022-01-03 | End: 2022-01-07 | Stop reason: HOSPADM

## 2022-01-03 RX ORDER — SODIUM CHLORIDE 0.9 % (FLUSH) 0.9 %
5-40 SYRINGE (ML) INJECTION PRN
Status: DISCONTINUED | OUTPATIENT
Start: 2022-01-03 | End: 2022-01-03

## 2022-01-03 RX ORDER — SODIUM CHLORIDE 0.9 % (FLUSH) 0.9 %
10 SYRINGE (ML) INJECTION PRN
Status: DISCONTINUED | OUTPATIENT
Start: 2022-01-03 | End: 2022-01-07 | Stop reason: HOSPADM

## 2022-01-03 RX ORDER — TOPIRAMATE 100 MG/1
200 TABLET, FILM COATED ORAL DAILY
Status: DISCONTINUED | OUTPATIENT
Start: 2022-01-03 | End: 2022-01-04

## 2022-01-03 RX ORDER — SODIUM CHLORIDE 0.9 % (FLUSH) 0.9 %
5-40 SYRINGE (ML) INJECTION EVERY 12 HOURS SCHEDULED
Status: DISCONTINUED | OUTPATIENT
Start: 2022-01-03 | End: 2022-01-07 | Stop reason: HOSPADM

## 2022-01-03 RX ORDER — 0.9 % SODIUM CHLORIDE 0.9 %
80 INTRAVENOUS SOLUTION INTRAVENOUS ONCE
Status: DISCONTINUED | OUTPATIENT
Start: 2022-01-03 | End: 2022-01-03

## 2022-01-03 RX ORDER — AZITHROMYCIN 250 MG/1
500 TABLET, FILM COATED ORAL EVERY 24 HOURS
Status: COMPLETED | OUTPATIENT
Start: 2022-01-04 | End: 2022-01-06

## 2022-01-03 RX ORDER — GUAIFENESIN 600 MG/1
600 TABLET, EXTENDED RELEASE ORAL 2 TIMES DAILY
Status: DISCONTINUED | OUTPATIENT
Start: 2022-01-03 | End: 2022-01-07 | Stop reason: HOSPADM

## 2022-01-03 RX ORDER — OLANZAPINE 5 MG/1
10 TABLET, ORALLY DISINTEGRATING ORAL NIGHTLY
Status: DISCONTINUED | OUTPATIENT
Start: 2022-01-03 | End: 2022-01-04 | Stop reason: CLARIF

## 2022-01-03 RX ORDER — SODIUM CHLORIDE 0.9 % (FLUSH) 0.9 %
5-40 SYRINGE (ML) INJECTION EVERY 12 HOURS SCHEDULED
Status: DISCONTINUED | OUTPATIENT
Start: 2022-01-03 | End: 2022-01-03

## 2022-01-03 RX ORDER — IPRATROPIUM BROMIDE AND ALBUTEROL SULFATE 2.5; .5 MG/3ML; MG/3ML
1 SOLUTION RESPIRATORY (INHALATION)
Status: DISCONTINUED | OUTPATIENT
Start: 2022-01-03 | End: 2022-01-04

## 2022-01-03 RX ORDER — TAMSULOSIN HYDROCHLORIDE 0.4 MG/1
0.4 CAPSULE ORAL DAILY
Status: DISCONTINUED | OUTPATIENT
Start: 2022-01-03 | End: 2022-01-07 | Stop reason: HOSPADM

## 2022-01-03 RX ORDER — OLANZAPINE 5 MG/1
5 TABLET, ORALLY DISINTEGRATING ORAL 3 TIMES DAILY PRN
Status: DISCONTINUED | OUTPATIENT
Start: 2022-01-03 | End: 2022-01-04 | Stop reason: DRUGHIGH

## 2022-01-03 RX ORDER — ACETAMINOPHEN 325 MG/1
650 TABLET ORAL EVERY 6 HOURS PRN
Status: DISCONTINUED | OUTPATIENT
Start: 2022-01-03 | End: 2022-01-07 | Stop reason: HOSPADM

## 2022-01-03 RX ORDER — ONDANSETRON 2 MG/ML
4 INJECTION INTRAMUSCULAR; INTRAVENOUS EVERY 6 HOURS PRN
Status: DISCONTINUED | OUTPATIENT
Start: 2022-01-03 | End: 2022-01-07 | Stop reason: HOSPADM

## 2022-01-03 RX ORDER — SODIUM CHLORIDE 9 MG/ML
25 INJECTION, SOLUTION INTRAVENOUS PRN
Status: DISCONTINUED | OUTPATIENT
Start: 2022-01-03 | End: 2022-01-07 | Stop reason: HOSPADM

## 2022-01-03 RX ORDER — ONDANSETRON 4 MG/1
4 TABLET, ORALLY DISINTEGRATING ORAL EVERY 8 HOURS PRN
Status: DISCONTINUED | OUTPATIENT
Start: 2022-01-03 | End: 2022-01-07 | Stop reason: HOSPADM

## 2022-01-03 RX ORDER — SODIUM CHLORIDE 9 MG/ML
25 INJECTION, SOLUTION INTRAVENOUS PRN
Status: DISCONTINUED | OUTPATIENT
Start: 2022-01-03 | End: 2022-01-03

## 2022-01-03 RX ORDER — ACETAMINOPHEN 650 MG/1
650 SUPPOSITORY RECTAL EVERY 6 HOURS PRN
Status: DISCONTINUED | OUTPATIENT
Start: 2022-01-03 | End: 2022-01-07 | Stop reason: HOSPADM

## 2022-01-03 RX ADMIN — AZITHROMYCIN MONOHYDRATE 500 MG: 500 INJECTION, POWDER, LYOPHILIZED, FOR SOLUTION INTRAVENOUS at 13:49

## 2022-01-03 RX ADMIN — SODIUM CHLORIDE 25 ML: 9 INJECTION, SOLUTION INTRAVENOUS at 13:47

## 2022-01-03 RX ADMIN — OLANZAPINE 10 MG: 5 TABLET, ORALLY DISINTEGRATING ORAL at 20:44

## 2022-01-03 RX ADMIN — SODIUM CHLORIDE, PRESERVATIVE FREE 10 ML: 5 INJECTION INTRAVENOUS at 14:11

## 2022-01-03 RX ADMIN — GUAIFENESIN 600 MG: 600 TABLET ORAL at 20:44

## 2022-01-03 RX ADMIN — SODIUM CHLORIDE, PRESERVATIVE FREE 10 ML: 5 INJECTION INTRAVENOUS at 09:53

## 2022-01-03 RX ADMIN — IOPAMIDOL 75 ML: 755 INJECTION, SOLUTION INTRAVENOUS at 14:11

## 2022-01-03 RX ADMIN — CEFTRIAXONE SODIUM 1000 MG: 1 INJECTION, POWDER, FOR SOLUTION INTRAMUSCULAR; INTRAVENOUS at 13:01

## 2022-01-03 RX ADMIN — SODIUM CHLORIDE 25 ML: 9 INJECTION, SOLUTION INTRAVENOUS at 13:00

## 2022-01-03 RX ADMIN — SODIUM CHLORIDE: 9 INJECTION, SOLUTION INTRAVENOUS at 18:35

## 2022-01-03 RX ADMIN — Medication 80 ML: at 14:11

## 2022-01-03 ASSESSMENT — ENCOUNTER SYMPTOMS
SORE THROAT: 0
NAUSEA: 0
COUGH: 1
BACK PAIN: 1
CONSTIPATION: 0
SHORTNESS OF BREATH: 1
CHEST TIGHTNESS: 0
ABDOMINAL PAIN: 0
EYES NEGATIVE: 1
DIARRHEA: 1
VOMITING: 0
WHEEZING: 0
TROUBLE SWALLOWING: 0

## 2022-01-03 ASSESSMENT — PAIN SCALES - GENERAL: PAINLEVEL_OUTOF10: 0

## 2022-01-03 NOTE — ED PROVIDER NOTES
[address] [place] [address] ED  EMERGENCY DEPARTMENT ENCOUNTER   ATTENDING ATTESTATION     Pt Name: Lizbeht Valles  MRN: [id number]  [place] 1955  Date of evaluation: 1/3/22       Lizbeth Valles is a 78 y.o. male who presents with Shortness of Breath (Onset 2 hours, PTA. )      MDM:     25-year-old presents with complaints of shortness of breath, cough, plan is basic labs cardiac enzymes the patient is on BiPAP tolerating it well. We will check a Covid swab and reevaluate. Vitals:   Vitals:    01/03/22 0910 01/03/22 0915   BP: (!) 153/82    Pulse: 108    Resp: 28 29   Temp: 99.1 °F (37.3 °C)    TempSrc: Axillary    SpO2: 100%      This visit was performed by both a physician and an APC. I personally evaluated and examined the patient.  I performed all aspects of the MDM as documented      Pavithra Lopez MD  Attending Emergency  Physician                  Marci Clarke MD  01/03/22 [id number]

## 2022-01-03 NOTE — ED NOTES
Pt family updated via telephone. Family denies any questions at this time.       Autumn Miranda RN  01/03/22 Shakir Stafford, ELDON  01/03/22 [id number]

## 2022-01-03 NOTE — ED NOTES
Bed: 08  Expected date: 1/3/22  Expected time: 8:50 AM  [facility]  [facility]  [facility]     Renny Sparks  01/03/22 [id number]

## 2022-01-03 NOTE — ED PROVIDER NOTES
[address] [place] [address] ED  [facility]      Pt Name: Emmer Crigler  MRN: [id number]  [place] 1955  Date ofevaluation: 1/3/2022  Provider: Viviana Marie Dr       Chief Complaint   Patient presents with    Shortness of Breath     Onset 2 hours, PTA. HISTORY OF PRESENT ILLNESS  (Location/Symptom, Timing/Onset, Context/Setting, Quality, Duration, Modifying Factors, Severity.)   Emmer Crigler is a 78 y.o. male who presents to the emergency department with shortness of breath for the last 2 hours. Patient became short of breath just prior to arrival.  Arrived via ambulance. Was given CPAP in route. BiPAP was established once patient arrives to the ER. Patient reports being vaccinated for COVID-19. Denies any chest pain but reports shortness of breath. No definite alleviating factors. Nursing Notes were reviewed. ALLERGIES     Depakote [divalproex sodium], Geodon [ziprasidone hcl], Haldol [haloperidol lactate], Stelazine [trifluoperazine], Trazodone, and Valproic acid    CURRENT MEDICATIONS       Previous Medications    OLANZAPINE ZYDIS (ZYPREXA) 10 MG DISINTEGRATING TABLET    Take 1 tablet by mouth nightly    OLANZAPINE ZYDIS (ZYPREXA) 5 MG DISINTEGRATING TABLET    Take 1 tablet by mouth 3 times daily as needed (agitatio)    TAMSULOSIN (FLOMAX) 0.4 MG CAPSULE    Take 1 capsule by mouth daily    TOPIRAMATE (TOPAMAX) 100 MG TABLET    Take 1 tablet by mouth daily       PAST MEDICAL HISTORY         Diagnosis Date    Bipolar disorder ([address]     GERD (gastroesophageal reflux disease)     Headache     Hiatal hernia     Hypertension     [facility] [address] [address]        SURGICAL HISTORY           Procedure Laterality Date    COLONOSCOPY  2014?     LUNG SURGERY Right     benign tumor removed    THORACOTOMY      for a chest tumor    TONSILLECTOMY           FAMILY HISTORY           Problem Relation Age of Onset    Stroke Mother     Alzheimer's Disease Mother     Alcohol Abuse Father     Other Father         vascular disease    Other Sister         Nervous breakdown     Family Status   Relation Name Status    Mother  (Not Specified)    Father  (Not Specified)    Sister  (Not Specified)        SOCIAL HISTORY      reports that he has been smoking cigarettes. He has been smoking about 1.50 packs per day. He has never used smokeless tobacco. He reports that he does not drink alcohol and does not use drugs. REVIEW OFSYSTEMS    (2-9 systems for level 4, 10 or more for level 5)   Review of Systems    Except as noted above the remainder of the review of systems was reviewed and negative. PHYSICAL EXAM    (up to 7 for level 4, 8 or more for level 5)     ED Triage Vitals [01/03/22 0910]   BP Temp Temp Source Pulse Resp SpO2 Height Weight   (!) 153/82 99.1 °F (37.3 °C) Axillary 108 28 100 % -- --      Physical Exam  Constitutional:       Appearance: He is well-developed. HENT:      Head: Normocephalic and atraumatic. Cardiovascular:      Rate and Rhythm: Normal rate and regular rhythm. Pulmonary:      Effort: Pulmonary effort is normal.      Breath sounds: Normal breath sounds. Abdominal:      Palpations: Abdomen is soft. Genitourinary:      Musculoskeletal:         General: Normal range of motion. Cervical back: Normal range of motion and neck supple. Skin:     General: Skin is warm. Neurological:      Mental Status: He is alert and oriented to person, place, and time.    Psychiatric:         Behavior: Behavior normal.                 DIAGNOSTIC RESULTS     EKG: All EKG's are interpreted by the Emergency Department Physician who either signs or Co-signs this chart in the absence of a cardiologist.        RADIOLOGY:   Non-plain film images such as CT, Ultrasound and MRI are read by the radiologist. Plain radiographic images arevisualized and preliminarily interpreted by the emergency physician with the below findings:        Interpretation per the Radiologist below, if available at thetime of this note:          ED BEDSIDE ULTRASOUND:   Performed by ED Physician - none    LABS:  Labs Reviewed   LACTATE, SEPSIS - Abnormal; Notable for the following components:       Result Value    Lactic Acid, Sepsis 2.7 (*)     All other components within normal limits   CBC WITH AUTO DIFFERENTIAL - Abnormal; Notable for the following components:    WBC 19.4 (*)     Seg Neutrophils 71 (*)     Lymphocytes 17 (*)     Immature Granulocytes 1 (*)     Segs Absolute 13.78 (*)     Absolute Mono # 1.94 (*)     All other components within normal limits   COMPREHENSIVE METABOLIC PANEL - Abnormal; Notable for the following components:    Glucose 136 (*)     All other components within normal limits   TROP/MYOGLOBIN - Abnormal; Notable for the following components:    Myoglobin 140 (*)     All other components within normal limits   TROP/MYOGLOBIN - Abnormal; Notable for the following components:    Troponin, High Sensitivity 25 (*)     Myoglobin 240 (*)     All other components within normal limits   TROP/MYOGLOBIN - Abnormal; Notable for the following components:    Troponin, High Sensitivity 23 (*)     Myoglobin 286 (*)     All other components within normal limits   BRAIN NATRIURETIC PEPTIDE - Abnormal; Notable for the following components:    Pro- (*)     All other components within normal limits   CULTURE, BLOOD 1   CULTURE, BLOOD 2   COVID-19, RAPID   RESPIRATORY PANEL, MOLECULAR, WITH COVID-19   GRAM STAIN   CULTURE, RESPIRATORY   LACTATE, SEPSIS   BASIC METABOLIC PANEL W/ REFLEX TO MG FOR LOW K   CBC   PROCALCITONIN       All other labs were within normal range or not returned as of this dictation.     EMERGENCY DEPARTMENT COURSE and DIFFERENTIAL DIAGNOSIS/MDM:   Vitals:    Vitals:    01/03/22 1630 01/03/22 1700 01/03/22 1728 01/03/22 1730   BP: (!) 103/59 (!) 98/59  111/65   Pulse: 71 68  69   Resp: 16   22   Temp:       TempSrc:       SpO2: 97% 97% 96% 98%     Covid swab is negative. CT scan negative for pulmonary embolism but is consistent with pneumonia as chest x-ray is as well. Chest x-ray respiratory question of viral or atypical pneumonia. 1)  Sepsis Identified at Time:      11:19 AM EST      2)  Sepsis Alert Protocol Guidelines:  · Blood Cultures drawn before antibiotics  · Broad Spectrum Antibiotics given stat within one hour  · Lactic Acid Q2 hours times 2 occurrences (if first lactic acid > 2.0)    3)  Fluid Bolus Guidelines:    If lactate > 4.0   OR   MAP less than 65  OR  systolic BP < 90 (two separate readings),            then 30ml/kg crystalloid fluid bolus infused, and may give over 4 hour duration. Is the patient Obese (BMI > 30): No hypotension and LACTIC NOT GREATER THAN 4 SO NO 30CC  PER KG BOLUS NEEDED. There is no height or weight on file to calculate BMI. Patient weight not recorded    If Obese the Ideal Body  (Alla formula):   Ideal body weight (IBW) (men) = 50 kg + 2.3 kg x (height, inches - 60)    Ideal body weight (IBW) (women) = 45.5 kg + 2.3 kg x (height, inches - 60)    4)  Repeat Sepsis Exam completed during fluid bolus at time:     2:05 PM EST      5)  Vasopressors: For persistent hypotension after completion of 30ml/kg fluid bolus (need to measure BP Q 15 min in the hour after completion of fluid bolus). Discuss risks and benefits of vasopressors and alternative therapies with patient and/or DPOA.    3:04 PM EST  Tolerating BIPap well    3:23 PM EST  Case discussed with Benedict De Paz NP from [facility] and admission was accepted. It should also be noted that patient has skin redness and irtiation in the fold of left inguinal area. Skin is irritated and could yeast.    CONSULTS:  IP CONSULT TO HOSPITALIST    PROCEDURES:  Procedures  The patient was evaluated during the global COVID-19 pandemic, and that diagnosis was suspected/considered upon their initial presentation.  Their evaluation, treatment and testing was consistent with current guidelines for patients who present with complaints or symptoms that may be related to COVID-19. CRITICAL CARE TIME     Due to the high probability of sudden and clinically significant deterioration in the patient's condition he required highest level of my preparedness to intervene urgently. I provided critical care time including documentation time, medication orders and management, reevaluation, vital sign assessment, ordering and reviewing of of lab tests ordering and reviewing of x-ray studies, and admission orders. Aggregate critical care time is between 35 minutes including only time during which I was engaged in work directly related to his care and did not include time spent treating other patients simultaneously. FINAL IMPRESSION      1. Pneumonia due to infectious organism, unspecified laterality, unspecified part of lung          DISPOSITION/PLAN   DISPOSITION Admitted 01/03/2022 03:20:39 PM      PATIENTREFERRED TO:   No follow-up provider specified.     DISCHARGE MEDICATIONS:     New Prescriptions    No medications on file           (Please note that portions of this note were completed with a voice recognition program.  Efforts were made to edit thedictations but occasionally words are mis-transcribed.)    DANYEL Chawla PA-C  01/03/22 [address] [place] [address], DANYEL  01/03/22 [address] [place] [address], DANYEL  01/03/22 5233

## 2022-01-03 NOTE — ED NOTES
Pt returned to room 8 from CT scan. Accompanied by RN, RT and CT tech.       Steve Og RN  01/03/22 [id number]

## 2022-01-03 NOTE — ED NOTES
Pt transported to CT scan via stretcher. Accompanied by RN, RT and CT tech.       Flaquito Beckwith RN  01/03/22 5871

## 2022-01-03 NOTE — ED NOTES
Pt arrived via EMS from assisted living facility. Pt is wheelchair bound. EMS reports pt was feeling SOB for 2 hours, PTA. Pt arrived on CPAP, SpO2 99%. Pt has history of COPD. Pt denies any pain.       Isabel Morrow RN  01/03/22 6178

## 2022-01-04 ENCOUNTER — APPOINTMENT (OUTPATIENT)
Dept: GENERAL RADIOLOGY | Age: 67
DRG: 871 | End: 2022-01-04
Payer: COMMERCIAL

## 2022-01-04 LAB
ADENOVIRUS PCR: NOT DETECTED
ANION GAP SERPL CALCULATED.3IONS-SCNC: 11 MMOL/L (ref 9–17)
BORDETELLA PARAPERTUSSIS: NOT DETECTED
BORDETELLA PERTUSSIS PCR: NOT DETECTED
BUN BLDV-MCNC: 16 MG/DL (ref 8–23)
BUN/CREAT BLD: 14 (ref 9–20)
CALCIUM SERPL-MCNC: 8.3 MG/DL (ref 8.6–10.4)
CHLAMYDIA PNEUMONIAE BY PCR: NOT DETECTED
CHLORIDE BLD-SCNC: 109 MMOL/L (ref 98–107)
CO2: 20 MMOL/L (ref 20–31)
CORONAVIRUS 229E PCR: NOT DETECTED
CORONAVIRUS HKU1 PCR: NOT DETECTED
CORONAVIRUS NL63 PCR: NOT DETECTED
CORONAVIRUS OC43 PCR: NOT DETECTED
CREAT SERPL-MCNC: 1.11 MG/DL (ref 0.7–1.2)
GFR AFRICAN AMERICAN: >60 ML/MIN
GFR NON-AFRICAN AMERICAN: >60 ML/MIN
GFR SERPL CREATININE-BSD FRML MDRD: ABNORMAL ML/MIN/{1.73_M2}
GFR SERPL CREATININE-BSD FRML MDRD: ABNORMAL ML/MIN/{1.73_M2}
GLUCOSE BLD-MCNC: 97 MG/DL (ref 70–99)
HCT VFR BLD CALC: 40.2 % (ref 40.7–50.3)
HEMOGLOBIN: 12.9 G/DL (ref 13–17)
HUMAN METAPNEUMOVIRUS PCR: NOT DETECTED
INFLUENZA A BY PCR: NOT DETECTED
INFLUENZA A H1 (2009) PCR: NORMAL
INFLUENZA A H1 PCR: NORMAL
INFLUENZA A H3 PCR: NORMAL
INFLUENZA B BY PCR: NOT DETECTED
LEGIONELLA PNEUMOPHILIA AG, URINE: NEGATIVE
MAGNESIUM: 2.1 MG/DL (ref 1.6–2.6)
MCH RBC QN AUTO: 29 PG (ref 25.2–33.5)
MCHC RBC AUTO-ENTMCNC: 32.1 G/DL (ref 28.4–34.8)
MCV RBC AUTO: 90.3 FL (ref 82.6–102.9)
MYCOPLASMA PNEUMONIAE PCR: NOT DETECTED
NRBC AUTOMATED: 0 PER 100 WBC
PARAINFLUENZA 1 PCR: NOT DETECTED
PARAINFLUENZA 2 PCR: NOT DETECTED
PARAINFLUENZA 3 PCR: NOT DETECTED
PARAINFLUENZA 4 PCR: NOT DETECTED
PDW BLD-RTO: 14.6 % (ref 11.8–14.4)
PLATELET # BLD: 224 K/UL (ref 138–453)
PMV BLD AUTO: 10.7 FL (ref 8.1–13.5)
POTASSIUM SERPL-SCNC: 3.5 MMOL/L (ref 3.7–5.3)
PROCALCITONIN: 0.21 NG/ML
RBC # BLD: 4.45 M/UL (ref 4.21–5.77)
RESP SYNCYTIAL VIRUS PCR: NOT DETECTED
RHINO/ENTEROVIRUS PCR: NOT DETECTED
SARS-COV-2, PCR: NOT DETECTED
SODIUM BLD-SCNC: 140 MMOL/L (ref 135–144)
SPECIMEN DESCRIPTION: NORMAL
WBC # BLD: 17.9 K/UL (ref 3.5–11.3)

## 2022-01-04 PROCEDURE — 2700000000 HC OXYGEN THERAPY PER DAY

## 2022-01-04 PROCEDURE — 99232 SBSQ HOSP IP/OBS MODERATE 35: CPT | Performed by: INTERNAL MEDICINE

## 2022-01-04 PROCEDURE — 36415 COLL VENOUS BLD VENIPUNCTURE: CPT

## 2022-01-04 PROCEDURE — 97530 THERAPEUTIC ACTIVITIES: CPT

## 2022-01-04 PROCEDURE — 86738 MYCOPLASMA ANTIBODY: CPT

## 2022-01-04 PROCEDURE — 86317 IMMUNOASSAY INFECTIOUS AGENT: CPT

## 2022-01-04 PROCEDURE — 85027 COMPLETE CBC AUTOMATED: CPT

## 2022-01-04 PROCEDURE — 2580000003 HC RX 258: Performed by: NURSE PRACTITIONER

## 2022-01-04 PROCEDURE — 6360000002 HC RX W HCPCS: Performed by: NURSE PRACTITIONER

## 2022-01-04 PROCEDURE — 2060000000 HC ICU INTERMEDIATE R&B

## 2022-01-04 PROCEDURE — 6370000000 HC RX 637 (ALT 250 FOR IP): Performed by: INTERNAL MEDICINE

## 2022-01-04 PROCEDURE — 80048 BASIC METABOLIC PNL TOTAL CA: CPT

## 2022-01-04 PROCEDURE — 94761 N-INVAS EAR/PLS OXIMETRY MLT: CPT

## 2022-01-04 PROCEDURE — 93005 ELECTROCARDIOGRAM TRACING: CPT | Performed by: PHYSICIAN ASSISTANT

## 2022-01-04 PROCEDURE — 84145 PROCALCITONIN (PCT): CPT

## 2022-01-04 PROCEDURE — 6370000000 HC RX 637 (ALT 250 FOR IP): Performed by: NURSE PRACTITIONER

## 2022-01-04 PROCEDURE — 87449 NOS EACH ORGANISM AG IA: CPT

## 2022-01-04 PROCEDURE — 97166 OT EVAL MOD COMPLEX 45 MIN: CPT

## 2022-01-04 PROCEDURE — 97162 PT EVAL MOD COMPLEX 30 MIN: CPT

## 2022-01-04 PROCEDURE — 94640 AIRWAY INHALATION TREATMENT: CPT

## 2022-01-04 PROCEDURE — 97535 SELF CARE MNGMENT TRAINING: CPT

## 2022-01-04 PROCEDURE — 83735 ASSAY OF MAGNESIUM: CPT

## 2022-01-04 PROCEDURE — 71045 X-RAY EXAM CHEST 1 VIEW: CPT

## 2022-01-04 RX ORDER — FLUOXETINE HYDROCHLORIDE 20 MG/1
20 CAPSULE ORAL DAILY
COMMUNITY

## 2022-01-04 RX ORDER — OXYBUTYNIN CHLORIDE 5 MG/1
5 TABLET ORAL DAILY
COMMUNITY

## 2022-01-04 RX ORDER — CYCLOBENZAPRINE HCL 10 MG
10 TABLET ORAL 3 TIMES DAILY PRN
COMMUNITY

## 2022-01-04 RX ORDER — IPRATROPIUM BROMIDE AND ALBUTEROL SULFATE 2.5; .5 MG/3ML; MG/3ML
1 SOLUTION RESPIRATORY (INHALATION) EVERY 4 HOURS PRN
Status: DISCONTINUED | OUTPATIENT
Start: 2022-01-04 | End: 2022-01-07 | Stop reason: HOSPADM

## 2022-01-04 RX ORDER — POTASSIUM CHLORIDE 20 MEQ/1
40 TABLET, EXTENDED RELEASE ORAL PRN
Status: DISCONTINUED | OUTPATIENT
Start: 2022-01-04 | End: 2022-01-07 | Stop reason: HOSPADM

## 2022-01-04 RX ORDER — TRAZODONE HYDROCHLORIDE 50 MG/1
50 TABLET ORAL NIGHTLY PRN
Status: ON HOLD | COMMUNITY
End: 2022-01-07 | Stop reason: HOSPADM

## 2022-01-04 RX ORDER — PREGABALIN 50 MG/1
50 CAPSULE ORAL 2 TIMES DAILY
COMMUNITY

## 2022-01-04 RX ORDER — OLANZAPINE 5 MG/1
10 TABLET ORAL NIGHTLY
Status: DISCONTINUED | OUTPATIENT
Start: 2022-01-04 | End: 2022-01-07 | Stop reason: HOSPADM

## 2022-01-04 RX ORDER — LEVOCETIRIZINE DIHYDROCHLORIDE 5 MG/1
5 TABLET, FILM COATED ORAL NIGHTLY
Status: ON HOLD | COMMUNITY
End: 2022-01-07 | Stop reason: HOSPADM

## 2022-01-04 RX ORDER — TOPIRAMATE 100 MG/1
200 TABLET, FILM COATED ORAL NIGHTLY
Status: DISCONTINUED | OUTPATIENT
Start: 2022-01-04 | End: 2022-01-07 | Stop reason: HOSPADM

## 2022-01-04 RX ORDER — FLUOXETINE HYDROCHLORIDE 20 MG/1
20 CAPSULE ORAL
Status: DISCONTINUED | OUTPATIENT
Start: 2022-01-05 | End: 2022-01-07 | Stop reason: HOSPADM

## 2022-01-04 RX ORDER — HYDROXYZINE HYDROCHLORIDE 25 MG/1
25 TABLET, FILM COATED ORAL 2 TIMES DAILY PRN
COMMUNITY

## 2022-01-04 RX ORDER — HYDROCODONE BITARTRATE AND ACETAMINOPHEN 5; 325 MG/1; MG/1
1 TABLET ORAL NIGHTLY
Status: ON HOLD | COMMUNITY
End: 2022-01-07 | Stop reason: SDUPTHER

## 2022-01-04 RX ORDER — ATORVASTATIN CALCIUM 10 MG/1
10 TABLET, FILM COATED ORAL NIGHTLY
COMMUNITY

## 2022-01-04 RX ORDER — POTASSIUM CHLORIDE 7.45 MG/ML
10 INJECTION INTRAVENOUS PRN
Status: DISCONTINUED | OUTPATIENT
Start: 2022-01-04 | End: 2022-01-07 | Stop reason: HOSPADM

## 2022-01-04 RX ORDER — OLANZAPINE 10 MG/1
10 TABLET ORAL NIGHTLY
COMMUNITY

## 2022-01-04 RX ORDER — HYDROXYZINE HYDROCHLORIDE 25 MG/1
25 TABLET, FILM COATED ORAL 2 TIMES DAILY PRN
Status: DISCONTINUED | OUTPATIENT
Start: 2022-01-04 | End: 2022-01-07 | Stop reason: HOSPADM

## 2022-01-04 RX ORDER — FUROSEMIDE 40 MG/1
40 TABLET ORAL DAILY
COMMUNITY

## 2022-01-04 RX ORDER — OLANZAPINE 5 MG/1
5 TABLET ORAL
COMMUNITY

## 2022-01-04 RX ORDER — LOPERAMIDE HYDROCHLORIDE 2 MG/1
2 CAPSULE ORAL 4 TIMES DAILY PRN
Status: ON HOLD | COMMUNITY
End: 2022-01-07 | Stop reason: HOSPADM

## 2022-01-04 RX ORDER — PREGABALIN 50 MG/1
50 CAPSULE ORAL 2 TIMES DAILY
Status: DISCONTINUED | OUTPATIENT
Start: 2022-01-04 | End: 2022-01-07 | Stop reason: HOSPADM

## 2022-01-04 RX ORDER — BISACODYL 10 MG
10 SUPPOSITORY, RECTAL RECTAL DAILY PRN
COMMUNITY

## 2022-01-04 RX ORDER — BUDESONIDE AND FORMOTEROL FUMARATE DIHYDRATE 160; 4.5 UG/1; UG/1
2 AEROSOL RESPIRATORY (INHALATION) 2 TIMES DAILY
Status: DISCONTINUED | OUTPATIENT
Start: 2022-01-04 | End: 2022-01-07 | Stop reason: HOSPADM

## 2022-01-04 RX ORDER — OLANZAPINE 5 MG/1
5 TABLET ORAL
Status: DISCONTINUED | OUTPATIENT
Start: 2022-01-05 | End: 2022-01-07 | Stop reason: HOSPADM

## 2022-01-04 RX ORDER — ATORVASTATIN CALCIUM 10 MG/1
10 TABLET, FILM COATED ORAL NIGHTLY
Status: DISCONTINUED | OUTPATIENT
Start: 2022-01-04 | End: 2022-01-07 | Stop reason: HOSPADM

## 2022-01-04 RX ADMIN — GUAIFENESIN 600 MG: 600 TABLET ORAL at 08:39

## 2022-01-04 RX ADMIN — TOPIRAMATE 200 MG: 100 TABLET, FILM COATED ORAL at 20:53

## 2022-01-04 RX ADMIN — SODIUM CHLORIDE: 9 INJECTION, SOLUTION INTRAVENOUS at 05:09

## 2022-01-04 RX ADMIN — TAMSULOSIN HYDROCHLORIDE 0.4 MG: 0.4 CAPSULE ORAL at 20:53

## 2022-01-04 RX ADMIN — IPRATROPIUM BROMIDE AND ALBUTEROL SULFATE 1 AMPULE: .5; 2.5 SOLUTION RESPIRATORY (INHALATION) at 11:23

## 2022-01-04 RX ADMIN — ENOXAPARIN SODIUM 30 MG: 30 INJECTION SUBCUTANEOUS at 00:10

## 2022-01-04 RX ADMIN — ACETAMINOPHEN 650 MG: 325 TABLET ORAL at 22:20

## 2022-01-04 RX ADMIN — AZITHROMYCIN MONOHYDRATE 500 MG: 250 TABLET ORAL at 08:39

## 2022-01-04 RX ADMIN — POTASSIUM CHLORIDE 40 MEQ: 1500 TABLET, EXTENDED RELEASE ORAL at 08:38

## 2022-01-04 RX ADMIN — PREGABALIN 50 MG: 50 CAPSULE ORAL at 20:53

## 2022-01-04 RX ADMIN — ENOXAPARIN SODIUM 30 MG: 30 INJECTION SUBCUTANEOUS at 08:39

## 2022-01-04 RX ADMIN — OLANZAPINE 10 MG: 5 TABLET, FILM COATED ORAL at 20:53

## 2022-01-04 RX ADMIN — CEFTRIAXONE SODIUM 1000 MG: 1 INJECTION, POWDER, FOR SOLUTION INTRAMUSCULAR; INTRAVENOUS at 12:19

## 2022-01-04 RX ADMIN — ACETAMINOPHEN 650 MG: 325 TABLET ORAL at 08:38

## 2022-01-04 RX ADMIN — SODIUM CHLORIDE: 9 INJECTION, SOLUTION INTRAVENOUS at 18:11

## 2022-01-04 RX ADMIN — ENOXAPARIN SODIUM 30 MG: 30 INJECTION SUBCUTANEOUS at 20:52

## 2022-01-04 RX ADMIN — ATORVASTATIN CALCIUM 10 MG: 10 TABLET, FILM COATED ORAL at 20:53

## 2022-01-04 RX ADMIN — PANTOPRAZOLE SODIUM 40 MG: 40 TABLET, DELAYED RELEASE ORAL at 05:08

## 2022-01-04 RX ADMIN — GUAIFENESIN 600 MG: 600 TABLET ORAL at 20:53

## 2022-01-04 RX ADMIN — IPRATROPIUM BROMIDE AND ALBUTEROL SULFATE 1 AMPULE: .5; 2.5 SOLUTION RESPIRATORY (INHALATION) at 07:52

## 2022-01-04 ASSESSMENT — PAIN SCALES - GENERAL
PAINLEVEL_OUTOF10: 6
PAINLEVEL_OUTOF10: 0
PAINLEVEL_OUTOF10: 2
PAINLEVEL_OUTOF10: 0

## 2022-01-04 NOTE — PROGRESS NOTES
PATIENT REFUSES TO WEAR BIPAP     [x] Risks and benefits explained to patient   [x] Patient refuses to wear Bipap stating \"I dont use one at home and my breathing is fine\"   [x] Patient verbalizes understanding of information presented.

## 2022-01-04 NOTE — PROGRESS NOTES
Occupational Therapy   Occupational Therapy Initial Assessment  Date: 2022   Patient Name: Deedra Goldberg  MRN: [id number]     : 1955    Date of Service: 2022    Discharge Recommendations:  Patient would benefit from continued therapy after discharge   Pt currently functioning below baseline. Would suggest additional therapy at time of discharge to maximize long term outcomes and prevent re-admission. Please refer to AM-PAC score for current level of function. OT Equipment Recommendations  Equipment Needed: Yes  Mobility Devices: ADL Assistive Devices  ADL Assistive Devices: Emergency Alert System;Long-handled [place];Long-handled Sponge;Reacher    Assessment   Performance deficits / Impairments: Decreased functional mobility ; Decreased ADL status; Decreased strength;Decreased endurance;Decreased high-level IADLs;Decreased fine motor control;Decreased safe awareness;Decreased cognition;Decreased balance;Decreased posture  Assessment: Pt would benefit from continued skilled OT services to increase I and safety during functional tasks to return home at prior level of function as able. Prognosis: Fair  Decision Making: Medium Complexity  OT Education: OT Role;Plan of Care;Transfer Training;Energy Conservation; ADL Adaptive Strategies  Patient Education: safety in function, fall prevention/call light use, benefits of being oob, recommendations for continued therapy, pursed lip breathing  Barriers to Learning: cognition  REQUIRES OT FOLLOW UP: Yes  Activity Tolerance  Activity Tolerance: Patient Tolerated treatment well;Patient limited by fatigue  Activity Tolerance: Fair +, pt very pleasant throughout and appreciative  Safety Devices  Safety Devices in place: Yes  Type of devices: Nurse notified;Gait belt;Call light within reach; Chair alarm in place; Left in chair;Patient at risk for falls           Patient Diagnosis(es): The encounter diagnosis was Pneumonia due to infectious organism, unspecified laterality, unspecified part of lung.     has a past medical history of Bipolar disorder ([facility] [address] GERD (gastroesophageal reflux disease), Headache, Hiatal hernia, Hypertension, and Schizophrenia ([address] has a past surgical history that includes thoracotomy; Tonsillectomy; Colonoscopy (2014?); and Lung surgery (Right). PER H&P: Surinder Gillette is a 78 y.o. male who presents to the emergency department with shortness of breath for the last 2 hours. Patient became short of breath just prior to arrival.  Arrived via ambulance. Was given CPAP in route. BiPAP was established once patient arrives to the ER. Patient reports being vaccinated for COVID-19. Denies any chest pain but reports shortness of breath. No definite alleviating factors. Restrictions  Restrictions/Precautions  Restrictions/Precautions: General Precautions,Fall Risk  Position Activity Restriction  Other position/activity restrictions: 3L O2 NC, RUE IV, telemetry    Subjective   General  Chart Reviewed: Yes  Patient assessed for rehabilitation services?: Yes  Family / Caregiver Present: No  Subjective  Subjective: \"thanks for getting me to the chair, my lower back was killing me\"  General Comment  Comments: Pt sitting on EOB, pleasant and agreeable to OT eval. RN [employer] pt for OT Eval. Pt seen this AM for simple OT assessment of functional mobility and ADL transfers, per activity order pt is up as tolerated.     Social/Functional History  Social/Functional History  Type of Home: Assisted living ([facility] (over 1.5 years))  Home Layout: One level  Home Access: Level entry  Bathroom Shower/Tub: Walk-in shower  Bathroom Toilet: Handicap height  Bathroom Equipment: Grab bars in shower,Shower chair,Grab bars around [place]: Rolling walker,Wheelchair-manual  ADL Assistance: Needs assistance (gets help for showering (set up/supervsion) & lower body dressing)  Homemaking Assistance: Needs assistance  Homemaking Responsibilities: No (facility provides)  Ambulation Assistance: Needs assistance  Transfer Assistance: Needs assistance (reports indep w/ slide board transfers to/from bed-chair, needs assist standing from chairs.)  Active : No  Patient's  Info: facility [facility]  Occupation: Retired  Type of occupation: post office-   Leisure & Hobbies: hang out w/ friends  Additional Comments: Pt denies any falls       Objective   Vision: Impaired  Vision Exceptions: Wears glasses for reading (States that he doesn't wear them \"I have pretty good sight\")  Hearing: Exceptions to [facility]  Hearing Exceptions: Hard of hearing/hearing concerns;Bilateral hearing aid    Orientation  Overall Orientation Status: Within Functional Limits  Observation/Palpation  Observation: 6' 3\" 302#, flat affect  Edema: slight in BLE's  Balance  Sitting Balance: Stand by assistance  Standing Balance: Moderate assistance (x2 staff assist with RW)  Standing Balance  Time: standing ~ 1-2 min  Activity: functional mobility  Functional Mobility  Functional - Mobility Device: Rolling Walker  Activity:  (bed to bedside chair)  Assist Level: Moderate assistance (x2 staff assist)  Functional Mobility Comments: Pt moves very slowly, required Max verbal cues for RW safety, upright posture, initation, pacing self, pursed lip breathing, awareness/assist with lines all to increase safety and reduce risk of falls. Toilet Transfers  Toilet Transfer: Unable to assess  Toilet Transfers Comments: Pt with no needs at this time.        ADL  Feeding: Setup  Grooming: Setup;Stand by assistance (seated)  UE Bathing: Setup;Minimal assistance  LE Bathing: Setup;Maximum assistance  UE Dressing: Setup;Minimal assistance (with hosp gown)  LE Dressing: Setup;Maximum assistance(assisted pt with socks)  Toileting: Maximum assistance  Additional Comments: Pt is limited by weakness, fatigue and overall body habitus       Tone RUE  RUE Tone: Normotonic  Tone LUE  LUE Tone: Normotonic  Coordination  Movements Are Fluid And Coordinated: No  Coordination and Movement description: Fine motor impairments;Tremors;Decreased accuracy; Right UE;Decreased speed;Left UE (Pt has a slight tremor in B hands states its chronic)          Bed mobility  Supine to Sit: Unable to assess  Sit to Supine: Unable to assess  Comment: BRIA Pt sitting on EOB at beginning of session and retired to bedside chair at end of session       Transfers  Sit to stand: Moderate assistance;2 Person assistance (with Rw)  Stand to sit: Moderate assistance;2 Person assistance  Transfer Comments: Pt given Mod verbal cues for RW safety, upright posture, initation, sequencing, controlled stand to sit, squaring self/AD up to surface and reaching back prior to sitting all to increase safety. Cognition  Overall Cognitive Status: Exceptions  Arousal/Alertness: Delayed responses to stimuli  Following Commands: Follows multistep commands with repitition; Follows multistep commands with increased time  Attention Span: Appears intact  Memory: Appears intact  Safety Judgement: Decreased awareness of need for assistance;Decreased awareness of need for safety  Problem Solving: Decreased awareness of errors;Assistance required to identify errors made;Assistance required to generate solutions;Assistance required to implement solutions;Assistance required to correct errors made  Insights: Decreased awareness of deficits  Initiation: Requires cues for some  Sequencing: Requires cues for some        Sensation  Overall Sensation Status: Impaired (reports chronic numbness/tingling in BLE and B hands d/t neuropathy)        LUE AROM (degrees)  LUE AROM : WFL  RUE AROM (degrees)  RUE AROM : WFL  LUE Strength  Gross LUE Strength: WFL  LUE Strength Comment: ~ 4/5  RUE Strength  Gross RUE Strength: WFL  RUE Strength Comment: ~ 4/5                   Plan   Plan  Times per week: 4-5x/wk 1x/day as lilia  Current Treatment Recommendations: Strengthening,Endurance Training,Balance Training,Functional Mobility Training,Safety Education & Training,Pain Management,Patient/Caregiver Education & Training,Equipment Evaluation, Education, & procurement,Self-Care / ADL,Home Management Training                          AM-PAC Score: 15             Goals  Short term goals  Time Frame for Short term goals: By discharge, pt to demo  Short term goal 1: ADL transfers and functional mobility to CGA with use of AD as needed. Short term goal 2: bed mobility to SBA with use of bedrails as needed. Short term goal 3: I with simple B UE HEP to maintain strength for functional tasks to return home at prior level of function as able. Short term goal 4: toileting to CGA with use of AD/BSC/grab bars as needed. Short term goal 5: UB ADLs to SBA and LB ADLs to Min A with use of AD/AE as needed. Long term goals  Long term goal 1: Pt to be I with fall prevention education, EC/WS tech, recommendations for AE/discharge with use of handouts as needed. Long term goal 2: Pt to demo increased standing lilia > 8 min with Mod A using AD as needed to reduce risk of falls during functional tasks. Patient Goals   Patient goals : To feel better! Therapy Time   Individual Concurrent Group Co-treatment   Time In 0828         Time Out 0906         Minutes 38          tx time : 24 min     Co-treatment with PT warranted secondary to decreased safety and independence requiring 2 skilled therapy professionals to address individual discipline's goals.           Lawrence Gaston, OT

## 2022-01-04 NOTE — DISCHARGE INSTR - COC
Continuity of Care Form    Patient Name: Deedra Goldberg   :  1955  MRN:  [id number]    Admit date:  1/3/2022  Discharge date:  22  Code Status Order: Full Code   Advance Directives:      Admitting Physician:  Rosas Chance MD  PCP: Xavier Juan, APRN - CNP    Discharging Nurse: [facility] Unit/Room#: 8424/0710-52  Discharging Unit Phone Number: [phone]    Emergency Contact:   Extended Emergency Contact Information  Primary Emergency Contact: Haley Milan [address] Phone: [phone]  Relation: Child    Past Surgical History:  Past Surgical History:   Procedure Laterality Date    COLONOSCOPY  ?     LUNG SURGERY Right     benign tumor removed    THORACOTOMY      for a chest tumor    TONSILLECTOMY         Immunization History:   Immunization History   Administered Date(s) Administered    COVID-19, Neil Luma, Primary or Immunocompromised, PF, 100mcg/0.5mL 2021, 2021       Active Problems:  Patient Active Problem List   Diagnosis Code    Severe mixed bipolar I disorder with psychotic features ([address] F31.64    Altered mental status R41.82    Hypokalemia E87.6    Hypophosphatemia E83.39    Acute bacterial conjunctivitis of left eye H10.32    Bipolar disorder ([address] F31.9    Schizoaffective disorder (HCC) F25.9    Psychosis ([address] F29    Acute renal failure (ARF) ([facility]) N17.9    Mild malnutrition (HCC) E44.1    Pneumonia J18.9    Benign prostatic hyperplasia N40.0    Bipolar affective disorder, current episode manic ([address] F31.10    Gastroesophageal reflux disease K21.9    Vitamin D deficiency E55.9    Primary hypertension I10    Hypoxia R09.02    Neuropathy G62.9       Isolation/Infection:   Isolation            No Isolation          Patient Infection Status       Infection Onset Added Last Indicated Last Indicated By Review Planned Expiration Resolved Resolved By    None active    Resolved    COVID-19 (Rule Out) 22 Respiratory Panel, Molecular, with COVID-19 (Restricted: peds pts or suitable admitted adults) (Ordered)   01/04/22 Rule-Out Test Resulted    COVID-19 (Rule Out) 01/03/22 01/03/22 01/03/22 COVID-19, Rapid (Ordered)   01/03/22 Rule-Out Test Resulted            Nurse Assessment:  Last Vital Signs: BP (!) 142/85   Pulse 86   Temp 98.7 °F (37.1 °C) (Oral)   Resp 18   Ht 6' 3\" (1.905 m)   Wt (!) 302 lb 6.4 oz (137.2 kg)   SpO2 97%   BMI 37.80 kg/m²     Last documented pain score (0-10 scale): Pain Level: 0  Last Weight:   Wt Readings from Last 1 Encounters:   01/03/22 (!) 302 lb 6.4 oz (137.2 kg)     Mental Status:  oriented, alert, coherent, logical, thought processes intact, and able to concentrate and follow conversation    IV Access:  - None    Nursing Mobility/ADLs:  Walking   Assisted  Transfer  Assisted  Bathing  Assisted  Dressing  Assisted  Toileting  Assisted  Feeding  Independent  Med Admin  Assisted  Med Delivery   whole    Wound Care Documentation and Therapy:        Elimination:  Continence: Bowel: Yes  Bladder: Yes  Urinary Catheter: None   Colostomy/Ileostomy/Ileal Conduit: No       Date of Last BM: 1/4/22    Intake/Output Summary (Last 24 hours) at 1/4/2022 1623  Last data filed at 1/4/2022 0800  Gross per 24 hour   Intake 1100 ml   Output 1300 ml   Net -200 ml     I/O last 3 completed shifts: In: 1399.9 [P.O.:200; I.V.:900; IV Piggyback:299.9]  Out: 950 [Urine:950]    Safety Concerns: At Risk for Falls    Impairments/Disabilities:      None    Nutrition Therapy:  Current Nutrition Therapy:   - Oral Diet:  General    Routes of Feeding: Oral  Liquids: No Restrictions  Daily Fluid Restriction: no  Last Modified Barium Swallow with Video (Video Swallowing Test): not done    Treatments at the Time of Hospital Discharge:   Respiratory Treatments:  na   Oxygen Therapy:  is not on home oxygen therapy.   Ventilator:    - No ventilator support    Rehab Therapies: Physical Therapy and Occupational Therapy  Weight Bearing Status/Restrictions: No weight bearing restirctions  Other Medical Equipment (for information only, NOT a DME order):  wheelchair and walker  Other Treatments:   Skilled nursing assessment   HHA as before. Patient's personal belongings (please select all that are sent with patient):  None    RN SIGNATURE:  Electronically signed by Lesa Oliveros RN on 1/7/22 at 3:35 PM EST    CASE MANAGEMENT/SOCIAL WORK SECTION    Inpatient Status Date: 1/3/22    Readmission Risk Assessment Score:  Readmission Risk              Risk of Unplanned Readmission:  15           Discharging to Facility/ Agency   [facility]: [facility]   Address:  Phone: [phone]  Fax: [phone]    Dialysis Facility (if applicable)   Name:  Address:  Dialysis Schedule:  Phone:  Fax:    / signature: Electronically signed by Yuliya Meléndez RN on 1/7/22 at 2:54 PM EST    PHYSICIAN SECTION    Prognosis: Good    Condition at Discharge: Stable    Rehab Potential (if transferring to Rehab): Good    Recommended Labs or Other Treatments After Discharge: PT OT    Physician Certification: I certify the above information and transfer of James Ovalles  is necessary for the continuing treatment of the diagnosis listed and that he requires [facility] for less 30 days.      Update Admission H&P: No change in H&P    PHYSICIAN SIGNATURE:  Electronically signed by Milly Larry MD on 1/7/22 at 1:34 PM EST

## 2022-01-04 NOTE — RT PROTOCOL NOTE
RT Nebulizer Bronchodilator Protocol Note    There is a bronchodilator order in the chart from a provider indicating to follow the RT Bronchodilator Protocol and there is an Initiate RT Bronchodilator Protocol order as well (see protocol at bottom of note). CXR Findings:  XR CHEST PORTABLE    Result Date: 1/3/2022  1. Mild scattered interstitial thickening, increased when compared to 06/07/2018, possibly interstitial edema, atypical/viral pneumonia, or fibrotic change. 2. Blunting at the right costophrenic angle, which could be related to a small pleural effusion or pleural scarring. The findings from the last RT Protocol Assessment were as follows:  Smoking: Smoker 15 pack years or more  Respiratory Pattern: Regular pattern and RR 12-20 bpm  Breath Sounds: Clear breath sounds  Cough: Strong, productive  Indication for Bronchodilator Therapy: Decreased or absent breath sounds  Bronchodilator Assessment Score: 2    Aerosolized bronchodilator medication orders have been revised according to the RT Nebulizer Bronchodilator Protocol below. Respiratory Therapist to perform RT Therapy Protocol Assessment initially then follow the protocol. Repeat RT Therapy Protocol Assessment PRN for score 0-3 or on second treatment, BID, and PRN for scores above 3. No Indications - adjust the frequency to every 6 hours PRN wheezing or bronchospasm, if no treatments needed after 48 hours then discontinue using Per Protocol order mode. If indication present, adjust the RT bronchodilator orders based on the Bronchodilator Assessment Score as indicated below. If a patient is on this medication at home then do not decrease Frequency below that used at home. 0-3 - enter or revise RT bronchodilator order(s) to equivalent RT Bronchodilator order with Frequency of every 4 hours PRN for wheezing or increased work of breathing using Per Protocol order mode.        4-6 - enter or revise RT Bronchodilator order(s) to two equivalent RT bronchodilator orders with one order with BID Frequency and one order with Frequency of every 4 hours PRN wheezing or increased work of breathing using Per Protocol order mode. 7-10 - enter or revise RT Bronchodilator order(s) to two equivalent RT bronchodilator orders with one order with TID Frequency and one order with Frequency of every 4 hours PRN wheezing or increased work of breathing using Per Protocol order mode. 11-13 - enter or revise RT Bronchodilator order(s) to one equivalent RT bronchodilator order with QID Frequency and an Albuterol order with Frequency of every 4 hours PRN wheezing or increased work of breathing using Per Protocol order mode. Greater than 13 - enter or revise RT Bronchodilator order(s) to one equivalent RT bronchodilator order with every 4 hours Frequency and an Albuterol order with Frequency of every 2 hours PRN wheezing or increased work of breathing using Per Protocol order mode. RT to enter RT Home Evaluation for COPD & MDI Assessment order using Per Protocol order mode.     Electronically signed by Neo Monten[place]ro RCP on 1/3/2022 at 11:40 PM

## 2022-01-04 NOTE — PROGRESS NOTES
Patient weight is between 101-149kg. For prophylaxis with Enoxaparin, Pharmacy adjusted the dose to account for the patient's increased body weight in accordance with hospital approved protocol. The dose has been changed to 30mg BID. Please contact pharmacy with any concerns @ [phone]. Thank you. Adolfo Pressley, [facility]  1/3/2022 10:13 PM  Weight = 137 kg.

## 2022-01-04 NOTE — PROGRESS NOTES
Patient admitted to 1022 from ED. Oriented to room. Call light in reach, Instructed on use of phone. Dtr did arrive and provide POA papers; placed in chart. TC to [facility] and requested patient's home medications, which they said they would fax. No yet arrived at 1. Report given to Evangelina.

## 2022-01-04 NOTE — PLAN OF CARE
Problem: Skin Integrity:  Goal: Will show no infection signs and symptoms  Description: Will show no infection signs and symptoms  1/4/2022 0335 by Yashira Pierce RN  Outcome: Met This Shift  1/4/2022 0334 by Yashira Pierce RN  Outcome: Met This Shift  Goal: Absence of new skin breakdown  Description: Absence of new skin breakdown  1/4/2022 0335 by Yashira Pierce RN  Outcome: Met This Shift  1/4/2022 0334 by Yashira Pierce RN  Outcome: Met This Shift     Problem: Falls - Risk of:  Goal: Will remain free from falls  Description: Will remain free from falls  1/4/2022 0335 by Yashira Pierce RN  Outcome: Met This Shift  1/4/2022 0334 by Yashira Pierce RN  Outcome: Met This Shift  Goal: Absence of physical injury  Description: Absence of physical injury  1/4/2022 0335 by Yashira Pierce RN  Outcome: Met This Shift  1/4/2022 0334 by Yashira Pierce RN  Outcome: Met This Shift

## 2022-01-04 NOTE — H&P
[facility]  Office: [address], DO, Primitivo Eugene, DO, Natacha Corcoran, DO, Janna Duarte Blood, DO, Nathan Daniel MD, Lashon Ryan MD, Virgen Feng MD, Black Goins MD, Agatha Wise MD, Queen Sunita MD, Alphonso Klinefelter, MD, Shai Villanueva, DO, Patric Platt, DO, Luisito Velasco MD,  Ana Frye, DO, Saadia Diana MD, Linda Mendez MD, Kelsy Lockwood MD, Alka Ortiz MD, Ana Naranjo MD, Jermaine Zee MD, Richard Hernandez MD, Larisa Culver, [facility], [facility], CNP, Antolin Grubbs, CNP, Shirin Bangura, CNS, [place], CNP, Ramy Burleson, CNP, Komal Quevedo, CNP, Araseli Lynn, CNP, Surinder Guzman, CNP, Otilio Beltran PA-C, Phillip Blake, [facility], Vince Cisneros, [facility], Yadi Freeman, CNP, Yayo Galindo, CNP, Jagjit Petit, CNP, Israel Flood, CNP, Yelitza Membreno, CNP, Adry Oliver, [facility]    HISTORY AND PHYSICAL EXAMINATION            Date:   1/3/2022  Patient name:  Alexa Cantrell  Date of admission:  1/3/2022  9:07 AM  MRN:   [id number]  Account:  [de-identified]  YOB: 1955  PCP:    ROLANDO Camp CNP  Room:   9515/6548-70  Code Status:    Full Code    Chief Complaint:     Chief Complaint   Patient presents with    Shortness of Breath     Onset 2 hours, PTA. History Obtained From:     patient    History of Present Illness:     Alexa Cantrell is a 78 y.o. Non- / non  male who presents with Shortness of Breath (Onset 2 hours, PTA. )   and is admitted to the hospital for the management of Pneumonia. Patient reports he has progressive dyspnea for the last couple of months. This morning however, he woke up and felt he couldn't breathe. He also endorses non-productive cough and chest congestion. He says one of the caregivers aat the [place] where he resides was just diagnosed with COVID. He reports he has had diarrhea, decreased appetite. He has had no fever, chills, or chest pain. He has chronic neuropathy in his hands and legs- uses a wheelchair to mobilize, although he says he is able to walk short distances. He is noted to have a PMH of bipolar disorder, schizophrenia, HTN, and GERD. He called EMS who brought him to the ED for evaluation. While in the ED, he required bipap. He told the ED provider he has been vaccinated against COVID. He had a low grade temp and was tachycardic and tachypneic upon arrival. WBC elevated at 19.4, trop sl elevated. COVID swab was negative. He was given IV Rocephin and IV Zithromax while in the ED. CT of the chest revealed thoracic spine compression deformities, emphysema, and left upper lobe pneumonia. He was able to be weaned from the bipap and placed on nasal cannula prior to going to the progressive unit. He is being admitted for further observation and management of pneumonia and hypoxia. Past Medical History:     Past Medical History:   Diagnosis Date    B[facility] disorder ([facility]     GERD (gastroesophageal reflux disease)     Headache     Hiatal hernia     Hypertension     Schizophrenia ([facility]         Past Surgical History:     Past Surgical History:   Procedure Laterality Date    COLONOSCOPY  2014?  LUNG SURGERY Right     benign tumor removed    THORACOTOMY      for a chest tumor    TONSILLECTOMY          Medications Prior to Admission:     Prior to Admission medications    Medication Sig Start Date End Date Taking?  Authorizing Provider   tamsulosin (FLOMAX) 0.4 MG capsule Take 1 capsule by mouth daily 6/16/18   Belinda Oliveira MD   topiramate (TOPAMAX) 100 MG tablet Take 1 tablet by mouth daily 6/16/18   Belinda Oliveira MD   OLANZapine zydis (ZYPREXA) 10 MG disintegrating tablet Take 1 tablet by mouth nightly 6/13/18   Micki Cortez APRN - CNS   OLANZapine zydis (ZYPREXA) 5 MG disintegrating tablet Take 1 tablet by mouth 3 times daily as needed (agitatio) 6/13/18   ROLANDO Luciano - CNS        Allergies:     Depakote [divalproex sodium], Geodon [ziprasidone hcl], Haldol [haloperidol lactate], Stelazine [trifluoperazine], Trazodone, and Valproic acid    Social History:     Tobacco:    reports that he has quit smoking. His smoking use included cigarettes. He smoked 1.50 packs per day. He has never used smokeless tobacco.  Alcohol:      reports no history of alcohol use. Drug Use:  reports no history of drug use. Family History:     Family History   Problem Relation Age of Onset    Stroke Mother     Alzheimer's Disease Mother     Alcohol Abuse Father     Other Father         vascular disease    Other Sister         Nervous breakdown       Review of Systems:     Positive and Negative as described in HPI. Review of Systems   Constitutional: Positive for appetite change. Negative for chills and fever. HENT: Negative for congestion, postnasal drip, sore throat and trouble swallowing. Eyes: Negative. Respiratory: Positive for cough and shortness of breath. Negative for chest tightness and wheezing. Cardiovascular: Negative. Gastrointestinal: Positive for diarrhea. Negative for abdominal pain, constipation, nausea and vomiting. Genitourinary: Negative. Musculoskeletal: Positive for back pain and gait problem. Negative for arthralgias and myalgias. Skin: Negative. Neurological: Positive for weakness and numbness. Negative for dizziness, light-headedness and headaches. Psychiatric/Behavioral: Negative for agitation and sleep disturbance. The patient is nervous/anxious. Physical Exam:   /72   Pulse 65   Temp 98.6 °F (37 °C) (Oral)   Resp 20   SpO2 94%   Temp (24hrs), Av.9 °F (37.2 °C), Min:98.6 °F (37 °C), Max:99.1 °F (37.3 °C)    No results for input(s): POCGLU in the last 72 hours.     Intake/Output Summary (Last 24 hours) at 1/3/2022 2001  Last data filed at 1/3/2022 1934  Gross per 24 hour   Intake 299.93 ml   Output 550 ml   Net -250.07 ml       Physical Exam  Vitals and nursing note reviewed. Constitutional:       General: He is not in acute distress. Appearance: He is ill-appearing. He is not toxic-appearing or diaphoretic. HENT:      Head: Normocephalic and atraumatic. Right Ear: External ear normal.      Left Ear: External ear normal.      Nose: Nose normal. No rhinorrhea. Mouth/Throat:      Mouth: Mucous membranes are moist.      Pharynx: No oropharyngeal exudate or posterior oropharyngeal erythema. Eyes:      General: No scleral icterus. Right eye: No discharge. Left eye: No discharge. Extraocular Movements: Extraocular movements intact. Conjunctiva/sclera: Conjunctivae normal.      Pupils: Pupils are equal, round, and reactive to light. Cardiovascular:      Rate and Rhythm: Normal rate and regular rhythm. Pulses: Normal pulses. Heart sounds: Normal heart sounds. No murmur heard. No friction rub. No gallop. Pulmonary:      Effort: Pulmonary effort is normal. No respiratory distress. Breath sounds: No wheezing, rhonchi or rales. Comments: Diminished breath sounds throughout all lung fields  Abdominal:      General: There is no distension. Palpations: Abdomen is soft. Tenderness: There is no abdominal tenderness. There is no guarding. Hernia: No hernia is present. Comments: Bowel sounds hypoactive   Musculoskeletal:      Right lower leg: Edema present. Left lower leg: Edema present. Comments: 2+ pitting edema BLE   Skin:     General: Skin is warm. Coloration: Skin is pale. Skin is not jaundiced. Findings: No bruising, erythema, lesion or rash. Neurological:      General: No focal deficit present. Mental Status: He is alert and oriented to person, place, and time. Psychiatric:         Attention and Perception: Attention normal.         Mood and Affect: Mood normal. Affect is blunt.          Speech: Speech normal.         Behavior: Behavior normal. Behavior is cooperative. Thought Content: Thought content normal.         Cognition and Memory: Cognition and memory normal.         Judgment: Judgment normal.         Investigations:      Laboratory Testing:  Recent Results (from the past 24 hour(s))   Culture, Blood 1    Collection Time: 01/03/22  9:29 AM    Specimen: Blood   Result Value Ref Range    Specimen Description . BLOOD     Special Requests LEFT AC 20ML     Culture NO GROWTH <24 HRS    Culture, Blood 2    Collection Time: 01/03/22  9:37 AM    Specimen: Blood   Result Value Ref Range    Specimen Description . BLOOD     Special Requests LEFT HAND 2 CC     Culture NO GROWTH <24 HRS    Lactate, Sepsis    Collection Time: 01/03/22  9:38 AM   Result Value Ref Range    Lactic Acid, Sepsis 2.7 (H) 0.5 - 1.9 mmol/L    Lactic Acid, Sepsis, Whole Blood NOT REPORTED 0.5 - 1.9 mmol/L   CBC Auto Differential    Collection Time: 01/03/22  9:38 AM   Result Value Ref Range    WBC 19.4 (H) 3.5 - 11.3 k/uL    RBC 5.15 4.21 - 5.77 m/uL    Hemoglobin 14.8 13.0 - 17.0 g/dL    Hematocrit 46.4 40.7 - 50.3 %    MCV 90.1 82.6 - 102.9 fL    MCH 28.7 25.2 - 33.5 pg    MCHC 31.9 28.4 - 34.8 g/dL    RDW 14.3 11.8 - 14.4 %    Platelets 721 963 - 188 k/uL    MPV 11.0 8.1 - 13.5 fL    NRBC Automated 0.0 0.0 per 100 WBC    Differential Type NOT REPORTED     WBC Morphology NOT REPORTED     RBC Morphology NOT REPORTED     Platelet Estimate NOT REPORTED     Seg Neutrophils 71 (H) 36 - 65 %    Lymphocytes 17 (L) 24 - 43 %    Monocytes 10 3 - 12 %    Eosinophils % 1 1 - 4 %    Basophils 0 0 - 2 %    Immature Granulocytes 1 (H) 0 %    Segs Absolute 13.78 (H) 1.50 - 8.10 k/uL    Absolute Lymph # 3.30 1.10 - 3.70 k/uL    Absolute Mono # 1.94 (H) 0.10 - 1.20 k/uL    Absolute Eos # 0.19 0.00 - 0.44 k/uL    Basophils Absolute 0.00 0.00 - 0.20 k/uL    Absolute Immature Granulocyte 0.19 0.00 - 0.30 k/uL   Comprehensive Metabolic Panel    Collection Time: 01/03/22  9:38 AM   Result Value Ref Range Glucose 136 (H) 70 - 99 mg/dL    BUN 14 8 - 23 mg/dL    CREATININE 1.09 0.70 - 1.20 mg/dL    Bun/Cre Ratio 13 9 - 20    Calcium 8.9 8.6 - 10.4 mg/dL    Sodium 141 135 - 144 mmol/L    Potassium 4.4 3.7 - 5.3 mmol/L    Chloride 107 98 - 107 mmol/L    CO2 21 20 - 31 mmol/L    Anion Gap 13 9 - 17 mmol/L    Alkaline Phosphatase 80 40 - 129 U/L    ALT 24 5 - 41 U/L    AST 19 <40 U/L    Total Bilirubin 0.85 0.3 - 1.2 mg/dL    Total Protein 7.8 6.4 - 8.3 g/dL    Albumin 4.2 3.5 - 5.2 g/dL    Albumin/Globulin Ratio NOT REPORTED 1.0 - 2.5    GFR Non-African American >60 >60 mL/min    GFR African American >60 >60 mL/min    GFR Comment          GFR Staging NOT REPORTED    TROP/MYOGLOBIN    Collection Time: 01/03/22  9:38 AM   Result Value Ref Range    Troponin, High Sensitivity 22 0 - 22 ng/L    Troponin T NOT REPORTED <0.03 ng/mL    Troponin Interp NOT REPORTED     Myoglobin 140 (H) 28 - 72 ng/mL   Brain Natriuretic Peptide    Collection Time: 01/03/22  9:38 AM   Result Value Ref Range    Pro- (H) <300 pg/mL    BNP Interpretation NOT REPORTED    COVID-19, Rapid    Collection Time: 01/03/22  9:47 AM    Specimen: Nasopharyngeal Swab   Result Value Ref Range    Specimen Description . NASOPHARYNGEAL SWAB     SARS-CoV-2, Rapid Not Detected Not Detected   Lactate, Sepsis    Collection Time: 01/03/22 12:08 PM   Result Value Ref Range    Lactic Acid, Sepsis 1.3 0.5 - 1.9 mmol/L    Lactic Acid, Sepsis, Whole Blood NOT REPORTED 0.5 - 1.9 mmol/L   TROP/MYOGLOBIN    Collection Time: 01/03/22 12:08 PM   Result Value Ref Range    Troponin, High Sensitivity 25 (H) 0 - 22 ng/L    Troponin T NOT REPORTED <0.03 ng/mL    Troponin Interp NOT REPORTED     Myoglobin 240 (H) 28 - 72 ng/mL   TROP/MYOGLOBIN    Collection Time: 01/03/22  2:05 PM   Result Value Ref Range    Troponin, High Sensitivity 23 (H) 0 - 22 ng/L    Troponin T NOT REPORTED <0.03 ng/mL    Troponin Interp NOT REPORTED     Myoglobin 286 (H) 28 - 72 ng/mL Imaging/Diagnostics:  XR CHEST PORTABLE    Result Date: 1/3/2022  1. Mild scattered interstitial thickening, increased when compared to 06/07/2018, possibly interstitial edema, atypical/viral pneumonia, or fibrotic change. 2. Blunting at the right costophrenic angle, which could be related to a small pleural effusion or pleural scarring. CT CHEST PULMONARY EMBOLISM W CONTRAST    Result Date: 1/3/2022  1. No clear evidence for central pulmonary embolus. 2. Mild dependent atelectasis and respiratory motion throughout the lungs. Moderate emphysema. Mild left upper lobe infiltrate/probable pneumonia. Follow-up is recommended to document resolution. 3. Cholelithiasis. Fatty liver. 4. 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 5. Age-indeterminate compression deformities of T2, T3, T5, T7 and T11. Consider further evaluation with MRI thoracic spine to assess for acuity/marrow edema. 6. Atherosclerotic calcification of the aorta and branch vasculature. Coronary artery disease. Cardiomegaly. RECOMMENDATIONS: Unavailable       Assessment :      Hospital Problems           Last Modified POA    * (Principal) Pneumonia 1/3/2022 Yes    Gastroesophageal reflux disease 1/3/2022 Yes    Primary hypertension 1/3/2022 Yes    Hypoxia 1/3/2022 Yes    Neuropathy 1/3/2022 Yes          Plan:     Patient status inpatient in the  Progressive Unit/Step down    Pneumonia: Supp O2 as needed to keep SpO2 > 92%. Continue IV Rocephin and Zithromax. IS, Acapella. Trend WBC. Check procalcitonin, mycoplasma, strep, viral resp panel, legionella. Duoneb q 4 hours while awake. GERD: Protonix daily  HTN: Monitor VS as ordered. AL to send complete med list.  Hypoxia: Supp O2 as needed to keep SpO2 > 92%. Monitor SpO2. Neuropathy: CT report reviewed. Obtain MRI of thoracic spine to determine chronicity of spine abnormalities. Fall precautions. PT/OT eval treat if no acute abnormalities on thoracic spine MRI.     Consultations:   IP CONSULT TO HOSPITALIST     Patient is admitted as inpatient status because of co-morbidities listed above, severity of signs and symptoms as outlined, requirement for current medical therapies and most importantly because of direct risk to patient if care not provided in a hospital setting. Expected length of stay > 48 hours.     ROLANDO Sutton NP  1/3/2022  8:01 PM    Copy sent to ROLANDO Norman - CNP

## 2022-01-04 NOTE — PROGRESS NOTES
Writer called [facility] to clarify topimax dosage. Writer spoke with nurse Mariam Irvin, pt takes 200mg daily. Relayed to pharmacy so pt can have medication.

## 2022-01-04 NOTE — PROGRESS NOTES
Physical Therapy    Facility/Department: [facility] PROGRESSIVE CARE  Initial Assessment    NAME: Leti Gonzalez  : 1955  MRN: [id number]    Date of Service: 2022   ELDON Rios reports patient is medically stable for therapy treatment this date. Chart reviewed prior to treatment and patient is agreeable for therapy. All lines intact and patient positioned comfortably at end of treatment. All patient needs addressed prior to ending therapy session. Per H&P:Henry Beckett is a 78 y.o. Non- / non  male who presents with Shortness of Breath (Onset 2 hours, PTA. ) and is admitted to the hospital for the management of Pneumonia. Patient reports he has progressive dyspnea for the last couple of months. This morning however, he woke up and felt he couldn't breathe. He also endorses non-productive cough and chest congestion. He says one of the caregivers aat the AL where he resides was just diagnosed with COVID. He reports he has had diarrhea, decreased appetite. He has had no fever, chills, or chest pain. He has chronic neuropathy in his hands and legs- uses a wheelchair to mobilize, although he says he is able to walk short distances. He is noted to have a PMH of bipolar disorder, schizophrenia, HTN, and GERD. He called EMS who brought him to the ED for evaluation. Discharge Recommendations:  Pt currently functioning below baseline. Would suggest additional therapy at time of discharge to maximize long term outcomes and prevent re-admission. Please refer to AM-PAC score for current level of function. Patient would benefit from continued therapy after discharge      Assessment   Body structures, Functions, Activity limitations: Decreased functional mobility ; Decreased strength;Decreased safe awareness;Decreased balance;Decreased endurance  Assessment: Pt tolerated PT eval well. Pt ambulated 3 steps to bedside chair w/ RW this date requiring skilled 2 person assist for safety throughout.   Pt presenting w/ strength, mobility, balance, and endurance deficits at this time and would benefit from continued skilled physical therapy to address these deficits in order to maximize independence w/ functional mobility. Prognosis: Good  Decision Making: Medium Complexity  PT Education: Goals;PT Role;Plan of Care;General Safety; Energy Conservation;Transfer Training;Functional Mobility Training  Patient Education: Pt educated on: purpose of acute PT eval, general safety awareness, safe transfers w/ RW, pursed lip breathing, and PT POC. Pt verbalized fair understanding. REQUIRES PT FOLLOW UP: Yes  Activity Tolerance  Activity Tolerance: Patient Tolerated treatment well       Patient Diagnosis(es): The encounter diagnosis was Pneumonia due to infectious organism, unspecified laterality, unspecified part of lung.     has a past medical history of Bipolar disorder ([facility] [address] GERD (gastroesophageal reflux disease), Headache, Hiatal hernia, Hypertension, and Schizophrenia ([facility] [address] has a past surgical history that includes thoracotomy; Tonsillectomy; Colonoscopy (2014?); and Lung surgery (Right). Restrictions  Restrictions/Precautions  Restrictions/Precautions: General Precautions,Fall Risk,Up as Tolerated  Required Braces or Orthoses?: No  Position Activity Restriction  Other position/activity restrictions: 3L O2 NC, RUE IV, telemetry  Vision/Hearing  Vision: Impaired  Vision Exceptions: Wears glasses for reading (States that he doesn't wear them \"I have pretty good sight\")  Hearing: Exceptions to [facility]  Hearing Exceptions: Hard of hearing/hearing concerns;Bilateral hearing aid     Subjective  General  Patient assessed for rehabilitation services?: Yes  Response To Previous Treatment: Not applicable  Family / Caregiver Present: No  Follows Commands: Within Functional Limits  General Comment  Comments: ELDON Rios stating pt appropriate for therapy.   ELDON Rios also reporting pt uses wc at baseline, but able to take a few steps w/ RW. Pt sitting EOB upon arrival, requesting to get up. Simple mobility eval performed this date w/o ther ex. Pt w/ up as tolerated order at time of PT eval.  Subjective  Subjective: Pt reporting feeling okay, wanting to get up. Pain Screening  Patient Currently in Pain: No        Orientation  Orientation  Overall Orientation Status: Within Functional Limits  Social/Functional History  Social/Functional History  Type of Home: Assisted living ([address] (over 1.5 years))  Home Layout: One level  Home Access: Level entry  Bathroom Shower/Tub: Walk-in shower  Bathroom Toilet: Handicap height  Bathroom Equipment: Grab bars in shower,Shower chair,Grab bars around [employer]  Home Equipment: Rolling walker,Wheelchair-manual  ADL Assistance: Needs assistance (gets help for showering (set up/supervsion) & lower body dressing)  Homemaking Assistance: Needs assistance  Homemaking Responsibilities: No (facility provides)  Ambulation Assistance: Needs assistance  Transfer Assistance: Needs assistance (reports indep w/ slide board transfers to/from bed-chair, needs assist standing from chairs.)  Active : No  Patient's  Info: facility [facility]  Occupation: Retired  Type of occupation: post office-   Leisure & Hobbies: hang out w/ friends  Additional Comments: Pt denies any falls  Cognition   Cognition  Overall Cognitive Status: Exceptions  Arousal/Alertness: Delayed responses to stimuli  Following Commands: Follows multistep commands with repitition; Follows multistep commands with increased time  Attention Span: Appears intact  Memory: Appears intact  Safety Judgement: Decreased awareness of need for assistance;Decreased awareness of need for safety  Problem Solving: Decreased awareness of errors;Assistance required to identify errors made;Assistance required to generate solutions;Assistance required to implement solutions;Assistance required to correct errors made  Insights: Decreased awareness of deficits  Initiation: Requires cues for some  Sequencing: Requires cues for some    Objective     Observation/Palpation  Posture: Fair  Edema: mild edema in BLE    AROM RLE (degrees)  RLE AROM: WFL  AROM LLE (degrees)  LLE AROM : WFL  AROM RUE (degrees)  RUE AROM : WFL  AROM LUE (degrees)  LUE AROM : WFL  Strength RLE  Comment: Not formally assessed this date. Movement against gravity noted at hip, knee, and ankle throughout session. Strength LLE  Comment: Not formally assessed this date. Movement against gravity noted at hip, knee, and ankle throughout session. Strength RUE  Comment: See OT assessment for detail  Strength LUE  Comment: See OT assessment for detail  Tone RLE  RLE Tone: Normotonic  Tone LLE  LLE Tone: Normotonic  Motor Control  Gross Motor?: WFL  Sensation  Overall Sensation Status: Impaired (reports chronic numbness/tingling in BLE and B hands d/t neuropathy)  Bed mobility  Comment: Not assessed this date. Pt seated at EOB upon arrival and retired to bedside chair upon writer's exit. Transfers  Sit to Stand: Moderate Assistance;2 Person Assistance  Stand to sit: Moderate Assistance;2 Person Assistance  Bed to Chair: Moderate assistance;2 Person Assistance  Comment: Pt w/ fair steadiness throughout transfers this date. Pt peformed 1 STS transfer throughout session, requiring increased time and effort to complete transfer. Pt requiring mod verbal cueing for proper hand placement throughout transfers w/ RW w/ fair return demo. Ambulation  Ambulation?: Yes  More Ambulation?: No  Ambulation 1  Surface: level tile  Device: Rolling Walker  Assistance:  Moderate assistance;2 Person assistance  Quality of Gait: fair steadiness  Gait Deviations: Slow Fay;Decreased step length;Decreased step height;Shuffles  Distance: 3 steps to bedside chair  Comments: Pt w/ fair steadiness throughout ambulation this date moving very slowly requiring assist w/ progression of RW throughout. Stairs/Curb  Stairs?: No     Balance  Posture: Fair  Sitting - Static: Good  Sitting - Dynamic: Good;-  Standing - Static: Fair;-  Standing - Dynamic: Fair;-  Comments: Standing balance assessed w/ RW        Plan   Plan  Times per week: 1-2x/day, 5-6x/week  Current Treatment Recommendations: Strengthening,Balance Training,Functional Mobility Training,Gait Training,Transfer Training,Endurance Training,Wheelchair Mobility Training,Safety Education & Training,Home Exercise Program,Patient/Caregiver Education & Training,Equipment Evaluation, Education, & procurement,Neuromuscular Re-education  Safety Devices  Type of devices: Call light within reach,Chair alarm in place,Gait belt,Nurse notified,Left in chair  Restraints  Initially in place: No    AM-PAC Score  AM-PAC Inpatient Mobility Raw Score : 11 (01/04/22 1547)  AM-PAC Inpatient T-Scale Score : 33.86 (01/04/22 1547)  Mobility Inpatient CMS 0-100% Score: 72.57 (01/04/22 1547)  Mobility Inpatient CMS G-Code Modifier : CL (01/04/22 1547)       Functional Outcome Measure-   Single Leg Stance Test:  0 sec. (<5 sec.= fall risk)    Goals  Short term goals  Time Frame for Short term goals: 12 visits  Short term goal 1: Pt to demonstrate bed mobility Nikki  Short term goal 2: Pt to perform STS transfers w/ RW Ed  Short term goal 3: Pt to ambulate at least 15ft w/ RW ModA  Short term goal 4: Pt to self propel wheelchair at least 50ft Nikki  Short term goal 5: Pt to actively participate in at least 30 minutes of physical therapy for ther act, ther ex, balance, gait, transfer, and endurance training  Patient Goals   Patient goals : To feel better       Therapy Time   Individual Concurrent Group Co-treatment   Time In 0828         Time Out 0906         Minutes 38           Treatment time: 25 minutes       Co-treatment with OT warranted secondary to decreased safety and independence requiring 2 skilled therapy professionals to address individual discipline's goals. Martínez Schneider, PT

## 2022-01-04 NOTE — CARE COORDINATION
Case Management Initial Discharge Plan  [place],         Readmission Risk              Risk of Unplanned Readmission:  15             Met with:patient to discuss discharge plans. Information verified: address, contacts, phone number, , insurance Yes  PCP: ROLANDO Gibbons CNP  Date of last visit: at [address] Provider: Jaime Vora     Discharge Planning  Current Residence:  [facility]   Living Arrangements:  Other (Comment)       Home is [place] at [place]:  Family Members       Current Services PTA:  [address]: [facility]. Patient able to perform ADL's:Assisted  DME in home:  W/c and walker   DME used to aid ambulation prior to admission:   W/c   DME used during admission:  tbd     Potential Assistance Needed:  N/A    [facility]    Potential Assistance Purchasing Medications:  No  Does patient want to participate in local refill/ meds to beds program?    no     Patient agreeable to home care: Yes  Freedom of choice provided:  yes      Type of Home Care Services:  [address]  Patient expects to be discharged to:    home     Prior SNF/Rehab Placement and Facility: [facility]   Agreeable to SNF/Rehab: No  [place] of choice provided: n/a   Evaluation: n/a    Expected Discharge date:  22  Follow Up Appointment: Best Day/ Time: Tuesday AM    Transportation provider:  Per ambulette   Transportation arrangements needed for discharge: Yes    Discharge Plan:   Met with patient to complete a discharge plan of care. Patient lives at [address] [place] [address] in [place] [address] and has [address] that assist with his ADLs. The nurses pass his medications for him. He sees facility NP Richard Jovel along with [place] doctors. Patient states he can use walker for few steps but mostly pivots to w/c and uses that around the facility. He also stated that he has therapy thru [address] [place] [address]. Call to [facility] at [phone] to confirm if he has therapy. She stated that agency name was [facility] care, no number or fax provided. Will need to research. Did place phone and fax number of [facility] for now     He was on 3 liters at time of assessment .  May need home 02 evaluation completed and set up for him at AL>         Electronically signed by Rupert Lopez, RN on 1/4/22 at 4:13 PM EST

## 2022-01-04 NOTE — PROGRESS NOTES
[facility]  Office: [address], DO, Milo Conroy, DO, Beck Bennett, DO, Ela Lam Blood, DO, Annette Coates MD, Dejah Coleman MD, Nisha Rivas MD, Blas Bower MD, Suri Clark MD, Matthew Lin MD, Patty Angelo MD, Dhruv Lebron, DO, Vincenzo Carter, DO, Albert Plamer MD,  Airam Urrutia DO, Agatha Knowles MD, Isabelle Diamond MD, Jatin Munguia MD, Caro Reese MD, Eron Lynn MD, Kathy Strickland MD, Bobbi Redmond MD, Severino Luevano [facility], [facility], CNP, Jax Veloz, CNP, Kyler Henderson, CNS, Sol Srivastava, CNP, Naomie Jackson, CNP, Preethi Lee, CNP, Kimberley Garcia, CNP, Adrian Alejo, CNP, Mariya John PA-C, Xavier Fisher, DNP, Mumtaz Jackson DNP, Neyda Paula, CNP, Rossana Jeffries, CNP, Thania Newell, CNP, Rosetta Robles, CNP, Claudette David, CNP, [facility]    Progress Note    1/4/2022    3:33 PM    Name:   Ирина Scott  MRN:     [id number]     [place]:      [de-identified]   Room:   [address] [place] [address] Day:  1  Admit Date:  1/3/2022  9:07 AM    PCP:   ROLANDO Buckley CNP  Code Status:  Full Code    Subjective:     C/C:   Chief Complaint   Patient presents with    Shortness of Breath     Onset 2 hours, PTA. Interval History Status:   Feeling slight improvement after coming to hospital  MRI thoracic spine is yet to be done which was ordered due to reported T2-T11 compression deformity (to no chronicity of the disease process)      Brief History:   Ирина Scott is a 78 y.o. Non- / non  male who presents with Shortness of Breath (Onset 2 hours, PTA. )   and is admitted to the hospital for the management of Pneumonia.     Patient reports he has progressive dyspnea for the last couple of months. This morning however, he woke up and felt he couldn't breathe. He also endorses non-productive cough and chest congestion.  He says one of the caregivers aat the AL where he resides was just diagnosed with COVID. He reports he has had diarrhea, decreased appetite. He has had no fever, chills, or chest pain. He has chronic neuropathy in his hands and legs- uses a wheelchair to mobilize, although he says he is able to walk short distances. He is noted to have a PMH of bipolar disorder, schizophrenia, HTN, and GERD. He called EMS who brought him to the ED for evaluation.      While in the ED, he required bipap. He told the ED provider he has been vaccinated against COVID. He had a low grade temp and was tachycardic and tachypneic upon arrival. WBC elevated at 19.4, trop sl elevated. COVID swab was negative. He was given IV Rocephin and IV Zithromax while in the ED. CT of the chest revealed thoracic spine compression deformities, emphysema, and left upper lobe pneumonia.     He was able to be weaned from the bipap and placed on nasal cannula prior to going to the progressive unit. He is being admitted for further observation and management of pneumonia and hypoxia      Review of Systems:     Constitutional:  negative for chills, fevers, sweats  Respiratory:  + for cough, dyspnea on exertion, shortness of breath, denies wheezing  Cardiovascular:  negative for chest pain, chest pressure/discomfort, lower extremity edema, palpitations  Gastrointestinal:  negative for abdominal pain, constipation, diarrhea, nausea, vomiting  Neurological:  negative for dizziness, headache    Medications: Allergies:     Allergies   Allergen Reactions    Depakote [Divalproex Sodium]     Geodon [Ziprasidone Hcl]     Haldol [Haloperidol Lactate]     Stelazine [Trifluoperazine]     Trazodone Other (See Comments)     Urinary incontinence     Valproic Acid      Other reaction(s): Liver Abnormalities  Jaundice        Current Meds:   Scheduled Meds:    OLANZapine zydis  10 mg Oral Nightly    tamsulosin  0.4 mg Oral Daily    topiramate  200 mg Oral Daily    sodium chloride flush  5-40 mL IntraVENous 2 times per day    enoxaparin  30 mg SubCUTAneous BID    cefTRIAXone (ROCEPHIN) IV  1,000 mg IntraVENous Q24H    And    azithromycin  500 mg Oral Q24H    guaiFENesin  600 mg Oral BID    pantoprazole  40 mg Oral QAM AC     Continuous Infusions:    sodium chloride 75 mL/hr at 22 0509    sodium chloride       PRN Meds: potassium chloride **OR** potassium alternative oral replacement **OR** potassium chloride, ipratropium-albuterol, OLANZapine zydis, sodium chloride flush, sodium chloride, ondansetron **OR** ondansetron, magnesium hydroxide, acetaminophen **OR** acetaminophen, albuterol    Data:     Past Medical History:   has a past medical history of Bipolar disorder ([facility] [address] GERD (gastroesophageal reflux disease), Headache, Hiatal hernia, Hypertension, and [facility] Social History:   reports that he has quit smoking. His smoking use included cigarettes. He smoked 1.50 packs per day. He has never used smokeless tobacco. He reports that he does not drink alcohol and does not use drugs. Family History:   Family History   Problem Relation Age of Onset    Stroke Mother     Alzheimer's Disease Mother     Alcohol Abuse Father     Other Father         vascular disease    Other Sister         Nervous breakdown       Vitals:  BP (!) 142/85   Pulse 86   Temp 98.7 °F (37.1 °C) (Oral)   Resp 18   Ht 6' 3\" (1.905 m)   Wt (!) 302 lb 6.4 oz (137.2 kg)   SpO2 97%   BMI 37.80 kg/m²   Temp (24hrs), Av.1 °F (36.7 °C), Min:97.3 °F (36.3 °C), Max:98.7 °F (37.1 °C)    No results for input(s): POCGLU in the last 72 hours. I/O (24Hr):     Intake/Output Summary (Last 24 hours) at 2022 1533  Last data filed at 2022 0800  Gross per 24 hour   Intake 1100 ml   Output 1300 ml   Net -200 ml       Labs:  Hematology:  Recent Labs     22  0938 22  0601   WBC 19.4* 17.9*   RBC 5.15 4.45   HGB 14.8 12.9*   HCT 46.4 40.2*   MCV 90.1 90.3   MCH 28.7 29.0   MCHC 31.9 32.1   RDW 14.3 14.6*    224   MPV 11.0 10.7     Chemistry:  Recent Labs     01/03/22  0938 01/03/22  1208 01/03/22  1405 01/04/22  0601     --   --  140   K 4.4  --   --  3.5*     --   --  109*   CO2 21  --   --  20   GLUCOSE 136*  --   --  97   BUN 14  --   --  16   CREATININE 1.09  --   --  1.11   MG  --   --   --  2.1   ANIONGAP 13  --   --  11   LABGLOM >60  --   --  >60   GFRAA >60  --   --  >60   CALCIUM 8.9  --   --  8.3*   PROBNP 421*  --   --   --    TROPHS 22 25* 23*  --    MYOGLOBIN 140* 240* 286*  --      Recent Labs     01/03/22  0938   PROT 7.8   LABALBU 4.2   AST 19   ALT 24   ALKPHOS 80   BILITOT 0.85     ABG:No results found for: POCPH, PHART, PH, POCPCO2, FIL6OWJ, PCO2, POCPO2, PO2ART, PO2, POCHCO3, ORL6GJS, HCO3, NBEA, PBEA, BEART, BE, THGBART, THB, QFO7STG, ALWI0OCM, V8FHKWIY, O2SAT, FIO2  Lab Results   Component Value Date/Time    SPECIAL LEFT HAND 2 CC 01/03/2022 09:37 AM     Lab Results   Component Value Date/Time    CULTURE NO GROWTH 1 DAY 01/03/2022 09:37 AM       Radiology:  XR CHEST PORTABLE    Result Date: 1/4/2022  Increasing perihilar and ground-glass airspace opacities bilaterally. Pattern may represent worsening edema or pneumonitis. XR CHEST PORTABLE    Result Date: 1/3/2022  1. Mild scattered interstitial thickening, increased when compared to 06/07/2018, possibly interstitial edema, atypical/viral pneumonia, or fibrotic change. 2. Blunting at the right costophrenic angle, which could be related to a small pleural effusion or pleural scarring. CT CHEST PULMONARY EMBOLISM W CONTRAST    Result Date: 1/3/2022  1. No clear evidence for central pulmonary embolus. 2. Mild dependent atelectasis and respiratory motion throughout the lungs. Moderate emphysema. Mild left upper lobe infiltrate/probable pneumonia. Follow-up is recommended to document resolution. 3. Cholelithiasis. Fatty liver. 4. 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  5. Age-indeterminate compression deformities of T2, T3, T5, T7 and T11. Consider further evaluation with MRI thoracic spine to assess for acuity/marrow edema. 6. Atherosclerotic calcification of the aorta and branch vasculature. Coronary artery disease. Cardiomegaly.  RECOMMENDATIONS: Unavailable       Physical Examination:        General appearance:  alert, cooperative and no distress  Mental Status:  oriented to person, place and time and normal affect  Lungs:  + Surgical scar on back of right chest, diminished breath sounds at bases, no wheezing   Heart:  regular rate and rhythm, no murmur  Abdomen:  soft, nontender, nondistended, normal bowel sounds, no masses, hepatomegaly, splenomegaly  Extremities:  + edema, no redness, tenderness in the calves  Skin:  no gross lesions, rashes, induration    Assessment:        Hospital Problems           Last Modified POA    * (Principal) Pneumonia 1/3/2022 Yes    Gastroesophageal reflux disease 1/3/2022 Yes    Primary hypertension 1/3/2022 Yes    Hypoxia 1/3/2022 Yes    Neuropathy 1/3/2022 Yes          Plan:        Continue IV antibiotics follow MRI result  To evaluate T2-T11 compression deformity and neuropathy  PT OT    Luiz Thomason MD  1/4/2022  3:33 PM

## 2022-01-04 NOTE — PROGRESS NOTES
MAGNESIA IS INEFFECTIVE.)  FLUoxetine (PROZAC) 20 MG capsule, Take 20 mg by mouth daily  furosemide (LASIX) 40 MG tablet, Take 40 mg by mouth daily  HYDROcodone-acetaminophen (NORCO) 5-325 MG per tablet, Take 1 tablet by mouth nightly. hydrOXYzine (ATARAX) 25 MG tablet, Take 25 mg by mouth 2 times daily as needed for Anxiety  loperamide (IMODIUM) 2 MG capsule, Take 2 mg by mouth 4 times daily as needed for Diarrhea  pregabalin (LYRICA) 50 MG capsule, Take 50 mg by mouth 2 times daily.   magnesium hydroxide (MILK OF MAGNESIA) 400 MG/5ML suspension, Take 15 mLs by mouth daily as needed for Constipation  OLANZapine (ZYPREXA) 5 MG tablet, Take 5 mg by mouth daily (with breakfast)  OLANZapine (ZYPREXA) 10 MG tablet, Take 10 mg by mouth nightly  topiramate (TOPAMAX) 200 MG tablet, Take 200 mg by mouth nightly  traZODone (DESYREL) 50 MG tablet, Take 50 mg by mouth nightly as needed for Sleep  levocetirizine (XYZAL) 5 MG tablet, Take 5 mg by mouth nightly  tamsulosin (FLOMAX) 0.4 MG capsule, Take 1 capsule by mouth daily (Patient taking differently: Take 0.4 mg by mouth nightly )

## 2022-01-05 ENCOUNTER — APPOINTMENT (OUTPATIENT)
Dept: MRI IMAGING | Age: 67
DRG: 871 | End: 2022-01-05
Payer: COMMERCIAL

## 2022-01-05 LAB
EKG ATRIAL RATE: 100 BPM
EKG ATRIAL RATE: 63 BPM
EKG P AXIS: 49 DEGREES
EKG P AXIS: 53 DEGREES
EKG P-R INTERVAL: 176 MS
EKG P-R INTERVAL: 194 MS
EKG Q-T INTERVAL: 344 MS
EKG Q-T INTERVAL: 436 MS
EKG QRS DURATION: 80 MS
EKG QRS DURATION: 88 MS
EKG QTC CALCULATION (BAZETT): 443 MS
EKG QTC CALCULATION (BAZETT): 446 MS
EKG R AXIS: 52 DEGREES
EKG R AXIS: 66 DEGREES
EKG T AXIS: 51 DEGREES
EKG T AXIS: 8 DEGREES
EKG VENTRICULAR RATE: 100 BPM
EKG VENTRICULAR RATE: 63 BPM
MYCOPLASMA PNEUMONIAE IGM: 0.32

## 2022-01-05 PROCEDURE — 93010 ELECTROCARDIOGRAM REPORT: CPT | Performed by: INTERNAL MEDICINE

## 2022-01-05 PROCEDURE — 72146 MRI CHEST SPINE W/O DYE: CPT

## 2022-01-05 PROCEDURE — 94640 AIRWAY INHALATION TREATMENT: CPT

## 2022-01-05 PROCEDURE — 6370000000 HC RX 637 (ALT 250 FOR IP): Performed by: NURSE PRACTITIONER

## 2022-01-05 PROCEDURE — 6370000000 HC RX 637 (ALT 250 FOR IP): Performed by: INTERNAL MEDICINE

## 2022-01-05 PROCEDURE — 2580000003 HC RX 258: Performed by: NURSE PRACTITIONER

## 2022-01-05 PROCEDURE — 6360000002 HC RX W HCPCS: Performed by: NURSE PRACTITIONER

## 2022-01-05 PROCEDURE — 99232 SBSQ HOSP IP/OBS MODERATE 35: CPT | Performed by: INTERNAL MEDICINE

## 2022-01-05 PROCEDURE — 72148 MRI LUMBAR SPINE W/O DYE: CPT

## 2022-01-05 PROCEDURE — 94761 N-INVAS EAR/PLS OXIMETRY MLT: CPT

## 2022-01-05 PROCEDURE — 2060000000 HC ICU INTERMEDIATE R&B

## 2022-01-05 RX ORDER — HYDROCODONE BITARTRATE AND ACETAMINOPHEN 5; 325 MG/1; MG/1
1 TABLET ORAL EVERY 6 HOURS PRN
Status: DISCONTINUED | OUTPATIENT
Start: 2022-01-05 | End: 2022-01-07 | Stop reason: HOSPADM

## 2022-01-05 RX ORDER — HYDROCODONE BITARTRATE AND ACETAMINOPHEN 5; 325 MG/1; MG/1
1 TABLET ORAL ONCE
Status: COMPLETED | OUTPATIENT
Start: 2022-01-05 | End: 2022-01-05

## 2022-01-05 RX ADMIN — PREGABALIN 50 MG: 50 CAPSULE ORAL at 21:43

## 2022-01-05 RX ADMIN — FLUOXETINE 20 MG: 20 CAPSULE ORAL at 09:45

## 2022-01-05 RX ADMIN — ENOXAPARIN SODIUM 30 MG: 30 INJECTION SUBCUTANEOUS at 21:42

## 2022-01-05 RX ADMIN — CEFTRIAXONE SODIUM 1000 MG: 1 INJECTION, POWDER, FOR SOLUTION INTRAMUSCULAR; INTRAVENOUS at 13:14

## 2022-01-05 RX ADMIN — SODIUM CHLORIDE: 9 INJECTION, SOLUTION INTRAVENOUS at 21:49

## 2022-01-05 RX ADMIN — BUDESONIDE AND FORMOTEROL FUMARATE DIHYDRATE 2 PUFF: 160; 4.5 AEROSOL RESPIRATORY (INHALATION) at 21:29

## 2022-01-05 RX ADMIN — OLANZAPINE 10 MG: 5 TABLET, FILM COATED ORAL at 21:43

## 2022-01-05 RX ADMIN — TOPIRAMATE 200 MG: 100 TABLET, FILM COATED ORAL at 21:43

## 2022-01-05 RX ADMIN — GUAIFENESIN 600 MG: 600 TABLET ORAL at 21:43

## 2022-01-05 RX ADMIN — PANTOPRAZOLE SODIUM 40 MG: 40 TABLET, DELAYED RELEASE ORAL at 06:22

## 2022-01-05 RX ADMIN — HYDROCODONE BITARTRATE AND ACETAMINOPHEN 1 TABLET: 5; 325 TABLET ORAL at 11:19

## 2022-01-05 RX ADMIN — PREGABALIN 50 MG: 50 CAPSULE ORAL at 09:45

## 2022-01-05 RX ADMIN — OLANZAPINE 5 MG: 5 TABLET, FILM COATED ORAL at 09:45

## 2022-01-05 RX ADMIN — HYDROCODONE BITARTRATE AND ACETAMINOPHEN 1 TABLET: 5; 325 TABLET ORAL at 03:36

## 2022-01-05 RX ADMIN — GUAIFENESIN 600 MG: 600 TABLET ORAL at 09:45

## 2022-01-05 RX ADMIN — AZITHROMYCIN MONOHYDRATE 500 MG: 250 TABLET ORAL at 09:45

## 2022-01-05 RX ADMIN — TAMSULOSIN HYDROCHLORIDE 0.4 MG: 0.4 CAPSULE ORAL at 21:43

## 2022-01-05 RX ADMIN — SODIUM CHLORIDE: 9 INJECTION, SOLUTION INTRAVENOUS at 06:28

## 2022-01-05 RX ADMIN — ATORVASTATIN CALCIUM 10 MG: 10 TABLET, FILM COATED ORAL at 21:43

## 2022-01-05 RX ADMIN — ENOXAPARIN SODIUM 30 MG: 30 INJECTION SUBCUTANEOUS at 09:45

## 2022-01-05 RX ADMIN — HYDROCODONE BITARTRATE AND ACETAMINOPHEN 1 TABLET: 5; 325 TABLET ORAL at 21:44

## 2022-01-05 ASSESSMENT — PAIN DESCRIPTION - PAIN TYPE: TYPE: ACUTE PAIN

## 2022-01-05 ASSESSMENT — PAIN DESCRIPTION - ORIENTATION: ORIENTATION: MID;LOWER

## 2022-01-05 ASSESSMENT — PAIN SCALES - GENERAL
PAINLEVEL_OUTOF10: 4
PAINLEVEL_OUTOF10: 9
PAINLEVEL_OUTOF10: 6

## 2022-01-05 ASSESSMENT — PAIN DESCRIPTION - DESCRIPTORS: DESCRIPTORS: ACHING

## 2022-01-05 ASSESSMENT — PAIN DESCRIPTION - LOCATION: LOCATION: BACK

## 2022-01-05 ASSESSMENT — PAIN DESCRIPTION - FREQUENCY: FREQUENCY: CONTINUOUS

## 2022-01-05 ASSESSMENT — PAIN DESCRIPTION - ONSET: ONSET: ON-GOING

## 2022-01-05 NOTE — PLAN OF CARE
Problem: Skin Integrity:  Goal: Will show no infection signs and symptoms  Description: Will show no infection signs and symptoms  1/5/2022 0742 by Evert Mason RN  Outcome: Ongoing     Problem: Skin Integrity:  Goal: Absence of new skin breakdown  Description: Absence of new skin breakdown  1/5/2022 0742 by Evert Mason RN  Outcome: Ongoing     Problem: Falls - Risk of:  Goal: Will remain free from falls  Description: Will remain free from falls  1/5/2022 0742 by Evert Mason RN  Outcome: Ongoing     Problem: Falls - Risk of:  Goal: Absence of physical injury  Description: Absence of physical injury  1/5/2022 0742 by Evert Mason RN  Outcome: Ongoing    Patient remained free of injuries. Bed in lowest locked position. Safety precautions in place. No signs of hypoxia or skin break down.

## 2022-01-05 NOTE — FLOWSHEET NOTE
Patient states about his trouble breathing. States having bi pap. States treated well. No major needs expressed.  shared in presence, prayers. Follow up as needed. 01/04/22 1953   Encounter Summary   Services provided to: Patient   Referral/Consult From: [facility]   (1-4-22)   Complexity of Encounter Low   Length of Encounter 15 minutes   Spiritual Assessment Completed Yes   Routine   Type Initial   Assessment Calm; Approachable   Intervention Active listening;Explored feelings, thoughts, concerns;Prayer;Discussed illness/injury and it's impact; Discussed belief system/Mandaen practices/tianna   Outcome Expressed gratitude;Receptive;Engaged in conversation;Expressed feelings/needs/concerns

## 2022-01-05 NOTE — CARE COORDINATION
Social Work-Contacted [address] [place] [address] regarding home care/PT. They requested  fax chart so they can eval to see if he is safe to return. Sent chart. [address] states that patient will need rehab before returning. Phone call to daughter. SHe is requesting a referral to [facility] in [place]. [facility]. Admissions is gone for the day. Will call in morning. Tayla Timmons

## 2022-01-05 NOTE — PROGRESS NOTES
DATE: 2022    NAME: Kimberley Cardoso  MRN: [id number]   : 1955    Patient not seen this date for Physical Therapy due to:      [] Cancel by RN or physician due to:    [] Hemodialysis    [] Critical Lab Value Level     [] Blood transfusion in progress    [] Acute or unstable cardiovascular status   _MAP < 55 or more than >115  _HR < 40 or > 130    [] Acute or unstable pulmonary status   -FiO2 > 60%   _RR < 5 or >40    _O2 sats < 85%    [] Strict Bedrest    [] Off Unit for surgery or procedure    [] Off Unit for testing       [] Pending imaging to R/O fracture    [] Refusal by Patient      [x] Other on hold until MRI results are available      [] PT being discontinued at this time. Patient independent. No further needs. [] PT being discontinued at this time as the patient has been transferred to hospice care. No further needs.       STACI ALMENDAREZ, PTA

## 2022-01-05 NOTE — PROGRESS NOTES
[address]  Occupational Therapy Not Seen    DATE: 2022    NAME: Ashlee Dunn  MRN: [id number]   : 1955    Patient not seen this date for Occupational Therapy due to:      [] Cancel by RN or physician due to:    [] Hemodialysis    [] Critical Lab Value Level     [] Blood transfusion in progress    [] Acute or unstable cardiovascular status   _MAP < 55 or more than >115  _HR < 40 or > 130    [] Acute or unstable pulmonary status   -FiO2 > 60%   _RR < 5 or >40    _O2 sats < 85%    [] Strict Bedrest    [] Off Unit for surgery or procedure    [] Off Unit for testing       [] Pending imaging to R/O fracture    [] Refusal by Patient      [x] Other on hold until MRI results are available              [] OT being discontinued at this time. Patient independent. No further needs. [] OT being discontinued at this time as the patient has been transferred to hospice care. No further needs.       CROW Salcido

## 2022-01-05 NOTE — PROGRESS NOTES
[facility]  Office: [address], DO, Delmer Ache, DO, Patino Hasten, DO, Ayaka Turner Blood, DO, Goran Schwartz MD, Dionna Bee MD, Yoselin Mittal MD, Kaylen Leon MD, Ginger Sandoval MD, Amanda Dominguez MD, Renu Headley MD, Stevan Weller, DO, Giovani Vickers, DO, Aung Douglas MD,  Seb Vick, DO, Pal Boss MD, George Crystal MD, Kathy Ireland MD, Santino Randall MD, Myriam Evans MD, Stacy Meadows MD, Karie Peterson MD, Christofer Najera, [facility], CNP, Bill Pennington, CNP, Max Avalos, CNS, Kane Courts, CNP, Courtney Silverman, CNP, Annalisa Jimenez, CNP, Bailee Ocean, CNP, Rhea Banda, CNP, Jean-Claude Martin PA-C, Lyla Gandhi, DNP, Aniya Carl, DNP, Keshav Duarte, CNP, Tre Crystal, CNP, Josephine Carlin, CNP, Kamari Goldstein, CNP, Tino Rae, CNP, [facility]    Progress Note    1/5/2022    1:49 PM    Name:   Leti Gonzalez  MRN:     [id number]     [place]:      [de-identified]   Room:   [address] [place] [address] Day:  2  Admit Date:  1/3/2022  9:07 AM    PCP:   ROLANDO Hdz CNP  Code Status:  Full Code    Subjective:     C/C:   Chief Complaint   Patient presents with    Shortness of Breath     Onset 2 hours, PTA. Interval History Status:   Feeling  improvement after coming to hospital  MRI thoracic spine is yet to be done which was ordered due to reported T2-T11 compression deformity (to no chronicity of the disease process)  Complains of low back pain due to hospital beds    Brief History:   Leti Gonzalez is a 78 y.o. Non- / non  male who presents with Shortness of Breath (Onset 2 hours, PTA. )   and is admitted to the hospital for the management of Pneumonia.     Patient reports he has progressive dyspnea for the last couple of months. This morning however, he woke up and felt he couldn't breathe. He also endorses non-productive cough and chest congestion.  He says one of the caregivers [facility]at the [place] where [facility] was just diagnosed with COVID. He reports he has had diarrhea, decreased appetite. He has had no fever, chills, or chest pain. He has chronic neuropathy in his hands and legs- uses a wheelchair to mobilize, although he says he is able to walk short distances. He is noted to have a PMH of bipolar disorder, schizophrenia, HTN, and GERD. He called EMS who brought him to the ED for evaluation.      While in the ED, he required bipap. He told the ED provider he has been vaccinated against COVID. He had a low grade temp and was tachycardic and tachypneic upon arrival. WBC elevated at 19.4, trop sl elevated. COVID swab was negative. He was given IV Rocephin and IV Zithromax while in the ED. CT of the chest revealed thoracic spine compression deformities, emphysema, and left upper lobe pneumonia.     He was able to be weaned from the bipap and placed on nasal cannula prior to going to the progressive unit. He is being admitted for further observation and management of pneumonia and hypoxia      Review of Systems:     Constitutional:  negative for chills, fevers, sweats  Respiratory:  + for cough, P2-T11 compression deformity shortness of breath, denies wheezing  Cardiovascular:  negative for chest pain, chest pressure/discomfort, lower extremity edema, palpitations  Gastrointestinal:  negative for abdominal pain, constipation, diarrhea, nausea, vomiting  Neurological:  negative for dizziness, headache    Medications: Allergies:     Allergies   Allergen Reactions    Depakote [Divalproex Sodium]     Geodon [Ziprasidone Hcl]     Haldol [Haloperidol Lactate]     Stelazine [Trifluoperazine]     Trazodone Other (See Comments)     Urinary incontinence     Valproic Acid      Other reaction(s): Liver Abnormalities  Jaundice        Current Meds:   Scheduled Meds:    topiramate  200 mg Oral Nightly    OLANZapine  10 mg Oral Nightly    OLANZapine  5 mg Oral Daily with breakfast  atorvastatin  10 mg Oral Nightly    FLUoxetine  20 mg Oral Daily with breakfast    pregabalin  50 mg Oral BID    budesonide-formoterol  2 puff Inhalation BID    tamsulosin  0.4 mg Oral Daily    sodium chloride flush  5-40 mL IntraVENous 2 times per day    enoxaparin  30 mg SubCUTAneous BID    cefTRIAXone (ROCEPHIN) IV  1,000 mg IntraVENous Q24H    And    azithromycin  500 mg Oral Q24H    guaiFENesin  600 mg Oral BID    pantoprazole  40 mg Oral QAM AC     Continuous Infusions:    sodium chloride 75 mL/hr at 22 6751    sodium chloride       PRN Meds: HYDROcodone 5 mg - acetaminophen, potassium chloride **OR** potassium alternative oral replacement **OR** potassium chloride, ipratropium-albuterol, hydrOXYzine, sodium chloride flush, sodium chloride, ondansetron **OR** ondansetron, magnesium hydroxide, acetaminophen **OR** acetaminophen, albuterol    Data:     Past Medical History:   has a past medical history of Bipolar disorder ([facility] [address] GERD (gastroesophageal reflux disease), Headache, Hiatal hernia, Hypertension, and Schizophrenia ([facility] [address] Social History:   reports that he has quit smoking. His smoking use included cigarettes. He smoked 1.50 packs per day. He has never used smokeless tobacco. He reports that he does not drink alcohol and does not use drugs. Family History:   Family History   Problem Relation Age of Onset    Stroke Mother     Alzheimer's Disease Mother     Alcohol Abuse Father     Other Father         vascular disease    Other Sister         Nervous breakdown       Vitals:  /85   Pulse 57   Temp 97.5 °F (36.4 °C) (Oral)   Resp 16   Ht 6' 3\" (1.905 m)   Wt (!) 302 lb 6.4 oz (137.2 kg)   SpO2 96%   BMI 37.80 kg/m²   Temp (24hrs), Av.7 °F (36.5 °C), Min:97.5 °F (36.4 °C), Max:98.1 °F (36.7 °C)    No results for input(s): POCGLU in the last 72 hours. I/O (24Hr):     Intake/Output Summary (Last 24 hours) at 2022 1344  Last data filed at 2022 1314  Gross per 24 hour   Intake 2619.31 ml   Output 950 ml   Net 1669.31 ml       Labs:  Hematology:  Recent Labs     01/03/22  0938 01/04/22  0601   WBC 19.4* 17.9*   RBC 5.15 4.45   HGB 14.8 12.9*   HCT 46.4 40.2*   MCV 90.1 90.3   MCH 28.7 29.0   MCHC 31.9 32.1   RDW 14.3 14.6*    224   MPV 11.0 10.7     Chemistry:  Recent Labs     01/03/22  0938 01/03/22  1208 01/03/22  1405 01/04/22  0601     --   --  140   K 4.4  --   --  3.5*     --   --  109*   CO2 21  --   --  20   GLUCOSE 136*  --   --  97   BUN 14  --   --  16   CREATININE 1.09  --   --  1.11   MG  --   --   --  2.1   ANIONGAP 13  --   --  11   LABGLOM >60  --   --  >60   GFRAA >60  --   --  >60   CALCIUM 8.9  --   --  8.3*   PROBNP 421*  --   --   --    TROPHS 22 25* 23*  --    MYOGLOBIN 140* 240* 286*  --      Recent Labs     01/03/22  0938   PROT 7.8   LABALBU 4.2   AST 19   ALT 24   ALKPHOS 80   BILITOT 0.85     ABG:No results found for: POCPH, PHART, PH, POCPCO2, ZNI6AWA, PCO2, POCPO2, PO2ART, PO2, POCHCO3, SXB1FSN, HCO3, NBEA, PBEA, BEART, BE, THGBART, THB, JCL4OVI, NCZR4HKF, X6HLSTAR, O2SAT, FIO2  Lab Results   Component Value Date/Time    SPECIAL LEFT HAND 2 CC 01/03/2022 09:37 AM     Lab Results   Component Value Date/Time    CULTURE NO GROWTH 2 DAYS 01/03/2022 09:37 AM       Radiology:  XR CHEST PORTABLE    Result Date: 1/4/2022  Increasing perihilar and ground-glass airspace opacities bilaterally. Pattern may represent worsening edema or pneumonitis. XR CHEST PORTABLE    Result Date: 1/3/2022  1. Mild scattered interstitial thickening, increased when compared to 06/07/2018, possibly interstitial edema, atypical/viral pneumonia, or fibrotic change. 2. Blunting at the right costophrenic angle, which could be related to a small pleural effusion or pleural scarring. CT CHEST PULMONARY EMBOLISM W CONTRAST    Result Date: 1/3/2022  1. No clear evidence for central pulmonary embolus.  2. Mild dependent atelectasis and respiratory motion throughout the lungs. Moderate emphysema. Mild left upper lobe infiltrate/probable pneumonia. Follow-up is recommended to document resolution. 3. Cholelithiasis. Fatty liver. 4. 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 5. Age-indeterminate compression deformities of T2, T3, T5, T7 and T11. Consider further evaluation with MRI thoracic spine to assess for acuity/marrow edema. 6. Atherosclerotic calcification of the aorta and branch vasculature. Coronary artery disease. Cardiomegaly.  RECOMMENDATIONS: Unavailable       Physical Examination:        General appearance:  alert, cooperative and no distress  Mental Status:  oriented to person, place and time and normal affect  Lungs:  + Surgical scar on back of right chest, diminished breath sounds at bases, no wheezing   Heart:  regular rate and rhythm, no murmur  Abdomen:  soft, nontender, nondistended, normal bowel sounds, no masses, hepatomegaly, splenomegaly  Extremities:  + edema, no redness, tenderness in the calves, has neuropathy of both hands and feet  Skin:  no gross lesions, rashes, induration    Assessment:        Hospital Problems           Last Modified POA    * (Principal) Pneumonia 1/3/2022 Yes    Gastroesophageal reflux disease 1/3/2022 Yes    Primary hypertension 1/3/2022 Yes    Hypoxia 1/3/2022 Yes    Neuropathy 1/3/2022 Yes      T2-T11 compression deformity    Plan:        Continue IV antibiotics   MRI thoracic spine to evaluate T2-T11 compression deformity and neuropathy  PT OT  Consult orthopedics due to T2-T11 compression deformity and low back pain  Evaluate for home oxygen at the time of discharge    Yesenia Sood MD  1/5/2022  1:49 PM

## 2022-01-05 NOTE — PROGRESS NOTES
Physician Progress Note      PATIENT:               Tato Mary  CSN #:                  [id number]  :                       1955  ADMIT DATE:       1/3/2022 9:07 AM  DISCH DATE:  RESPONDING  PROVIDER #:        Chantell Garland MD          QUERY TEXT:    Pt admitted with pneumonia. Pt noted to have 3/4 SIRS on admission. If   possible, please document in the progress notes and discharge summary if you   are evaluating and /or treating any of the following: The medical record reflects the following:  Risk Factors: wheelchair bound, admitted with pneumonia, tobacco abuse  Clinical Indicators:  1/3 on arrival:  T 37.3 HR  RR 28-31/min on BIPAP,   Lactic acid 2.7 improved to 1.3 in less than 3 hours, procalcitonin 0.10   increased to 0.21 on , WBC 19.4 on 1/3, 17.9 on ;  code sepsis called in   ED  Treatment: admission, diagnostic testing, Rocephin and Azithromycin  Options provided:  -- Sepsis present on admission  -- Pneumonia without sepsis  -- Other - I will add my own diagnosis  -- Disagree - Not applicable / Not valid  -- Disagree - Clinically unable to determine / Unknown  -- Refer to Clinical Documentation Reviewer    PROVIDER RESPONSE TEXT:    This patient has sepsis which was present on admission.     Query created by: Radha Howe on 2022 12:34 PM      Electronically signed by:  Chantell Garland MD 2022 6:33 PM

## 2022-01-06 PROBLEM — S22.000A THORACIC COMPRESSION FRACTURE (HCC): Status: ACTIVE | Noted: 2022-01-06

## 2022-01-06 PROBLEM — M80.88XA PATHOLOGICAL FRACTURE OF THORACIC VERTEBRA DUE TO SECONDARY OSTEOPOROSIS (HCC): Status: ACTIVE | Noted: 2022-01-06

## 2022-01-06 PROBLEM — M81.0 AGE-RELATED OSTEOPOROSIS WITHOUT CURRENT PATHOLOGICAL FRACTURE: Status: ACTIVE | Noted: 2022-01-06

## 2022-01-06 PROBLEM — M48.061 LUMBAR STENOSIS WITHOUT NEUROGENIC CLAUDICATION: Status: ACTIVE | Noted: 2022-01-06

## 2022-01-06 PROBLEM — R65.10 SIRS (SYSTEMIC INFLAMMATORY RESPONSE SYNDROME) (HCC): Status: ACTIVE | Noted: 2022-01-06

## 2022-01-06 LAB — VITAMIN D 25-HYDROXY: 17.7 NG/ML (ref 30–100)

## 2022-01-06 PROCEDURE — 6360000002 HC RX W HCPCS: Performed by: NURSE PRACTITIONER

## 2022-01-06 PROCEDURE — 6370000000 HC RX 637 (ALT 250 FOR IP): Performed by: INTERNAL MEDICINE

## 2022-01-06 PROCEDURE — 2580000003 HC RX 258: Performed by: NURSE PRACTITIONER

## 2022-01-06 PROCEDURE — 82306 VITAMIN D 25 HYDROXY: CPT

## 2022-01-06 PROCEDURE — 97535 SELF CARE MNGMENT TRAINING: CPT | Performed by: NURSE PRACTITIONER

## 2022-01-06 PROCEDURE — 6370000000 HC RX 637 (ALT 250 FOR IP): Performed by: NURSE PRACTITIONER

## 2022-01-06 PROCEDURE — 97116 GAIT TRAINING THERAPY: CPT

## 2022-01-06 PROCEDURE — 36415 COLL VENOUS BLD VENIPUNCTURE: CPT

## 2022-01-06 PROCEDURE — 94640 AIRWAY INHALATION TREATMENT: CPT

## 2022-01-06 PROCEDURE — 2060000000 HC ICU INTERMEDIATE R&B

## 2022-01-06 PROCEDURE — 97530 THERAPEUTIC ACTIVITIES: CPT

## 2022-01-06 PROCEDURE — 99232 SBSQ HOSP IP/OBS MODERATE 35: CPT | Performed by: INTERNAL MEDICINE

## 2022-01-06 RX ORDER — ERGOCALCIFEROL 1.25 MG/1
50000 CAPSULE ORAL WEEKLY
Status: DISCONTINUED | OUTPATIENT
Start: 2022-01-06 | End: 2022-01-07 | Stop reason: HOSPADM

## 2022-01-06 RX ADMIN — OLANZAPINE 5 MG: 5 TABLET, FILM COATED ORAL at 09:33

## 2022-01-06 RX ADMIN — GUAIFENESIN 600 MG: 600 TABLET ORAL at 19:26

## 2022-01-06 RX ADMIN — ATORVASTATIN CALCIUM 10 MG: 10 TABLET, FILM COATED ORAL at 19:25

## 2022-01-06 RX ADMIN — ERGOCALCIFEROL 50000 UNITS: 1.25 CAPSULE ORAL at 15:44

## 2022-01-06 RX ADMIN — OLANZAPINE 10 MG: 5 TABLET, FILM COATED ORAL at 19:27

## 2022-01-06 RX ADMIN — ACETAMINOPHEN 650 MG: 325 TABLET ORAL at 09:32

## 2022-01-06 RX ADMIN — HYDROCODONE BITARTRATE AND ACETAMINOPHEN 1 TABLET: 5; 325 TABLET ORAL at 19:25

## 2022-01-06 RX ADMIN — TOPIRAMATE 200 MG: 100 TABLET, FILM COATED ORAL at 19:28

## 2022-01-06 RX ADMIN — ENOXAPARIN SODIUM 30 MG: 30 INJECTION SUBCUTANEOUS at 09:33

## 2022-01-06 RX ADMIN — GUAIFENESIN 600 MG: 600 TABLET ORAL at 09:33

## 2022-01-06 RX ADMIN — PREGABALIN 50 MG: 50 CAPSULE ORAL at 09:33

## 2022-01-06 RX ADMIN — HYDROCODONE BITARTRATE AND ACETAMINOPHEN 1 TABLET: 5; 325 TABLET ORAL at 06:28

## 2022-01-06 RX ADMIN — BUDESONIDE AND FORMOTEROL FUMARATE DIHYDRATE 2 PUFF: 160; 4.5 AEROSOL RESPIRATORY (INHALATION) at 10:17

## 2022-01-06 RX ADMIN — SODIUM CHLORIDE: 9 INJECTION, SOLUTION INTRAVENOUS at 11:33

## 2022-01-06 RX ADMIN — PREGABALIN 50 MG: 50 CAPSULE ORAL at 19:25

## 2022-01-06 RX ADMIN — TAMSULOSIN HYDROCHLORIDE 0.4 MG: 0.4 CAPSULE ORAL at 19:24

## 2022-01-06 RX ADMIN — PANTOPRAZOLE SODIUM 40 MG: 40 TABLET, DELAYED RELEASE ORAL at 06:28

## 2022-01-06 RX ADMIN — CEFTRIAXONE SODIUM 1000 MG: 1 INJECTION, POWDER, FOR SOLUTION INTRAMUSCULAR; INTRAVENOUS at 12:11

## 2022-01-06 RX ADMIN — AZITHROMYCIN MONOHYDRATE 500 MG: 250 TABLET ORAL at 09:32

## 2022-01-06 RX ADMIN — ENOXAPARIN SODIUM 30 MG: 30 INJECTION SUBCUTANEOUS at 19:26

## 2022-01-06 RX ADMIN — ACETAMINOPHEN 650 MG: 325 TABLET ORAL at 02:11

## 2022-01-06 RX ADMIN — BUDESONIDE AND FORMOTEROL FUMARATE DIHYDRATE 2 PUFF: 160; 4.5 AEROSOL RESPIRATORY (INHALATION) at 19:38

## 2022-01-06 RX ADMIN — FLUOXETINE 20 MG: 20 CAPSULE ORAL at 09:33

## 2022-01-06 ASSESSMENT — PAIN SCALES - GENERAL
PAINLEVEL_OUTOF10: 0
PAINLEVEL_OUTOF10: 3
PAINLEVEL_OUTOF10: 6
PAINLEVEL_OUTOF10: 5
PAINLEVEL_OUTOF10: 6
PAINLEVEL_OUTOF10: 5

## 2022-01-06 ASSESSMENT — PAIN DESCRIPTION - DESCRIPTORS
DESCRIPTORS: ACHING

## 2022-01-06 ASSESSMENT — PAIN DESCRIPTION - LOCATION
LOCATION: HIP
LOCATION: BACK
LOCATION: HIP

## 2022-01-06 ASSESSMENT — PAIN SCALES - WONG BAKER
WONGBAKER_NUMERICALRESPONSE: 0

## 2022-01-06 ASSESSMENT — PAIN DESCRIPTION - PAIN TYPE
TYPE: ACUTE PAIN

## 2022-01-06 ASSESSMENT — PAIN DESCRIPTION - ORIENTATION
ORIENTATION: LEFT;RIGHT
ORIENTATION: LEFT;RIGHT

## 2022-01-06 NOTE — PROGRESS NOTES
Occupational Therapy  Facility/Department: [facility] PROGRESSIVE CARE  Daily Treatment Note  NAME: Carlitos Alexander  : 1955  MRN: [id number]    Date of Service: 2022    Discharge Recommendations:  Patient would benefit from continued therapy after discharge   Pt currently functioning below baseline. Would suggest additional therapy at time of discharge to maximize long term outcomes and prevent re-admission. Please refer to AM-PAC score for current level of function. Writer called [phone] from [employer], to speak with [place] re:pt current level of function vs PLOF. Writer explained pt is modAx2 from bed, otherwise pt is minAx1 for sit>stand transfers. Pt also able to complete bed>< WC slideboard transfer SBA. OT Equipment Recommendations  Equipment Needed: Yes  ADL Assistive Devices: Emergency Alert System;Long-handled [place];Long-handled Sponge;Reacher    Assessment   Performance deficits / Impairments: Decreased functional mobility ; Decreased ADL status; Decreased strength;Decreased endurance;Decreased high-level IADLs;Decreased fine motor control;Decreased safe awareness;Decreased cognition;Decreased balance;Decreased posture  Assessment: Pt would benefit from continued skilled OT services to address deficits in areas of functional balance, functional reach, ADL completion, safety awareness, transfers, UE strength and functional mobility, all limiting safe return home to PLOF. Prognosis: Good  OT Education: OT Role;Plan of Care;Transfer Training;Energy Conservation;Precautions; Home Exercise Program;Equipment  Patient Education: Breathing tech, anxiety around falling/SOB, IS  REQUIRES OT FOLLOW UP: Yes  Activity Tolerance  Activity Tolerance: Patient Tolerated treatment well  Safety Devices  Safety Devices in place: Yes  Type of devices: Nurse notified;Gait belt;Call light within reach; Chair alarm in place; Left in chair;Patient at risk for falls; All fall risk precautions in place Patient Diagnosis(es): The encounter diagnosis was Pneumonia due to infectious organism, unspecified laterality, unspecified part of lung.      has a past medical history of Bipolar disorder ([facility] [address] GERD (gastroesophageal reflux disease), Headache, Hiatal hernia, Hypertension, and Schizophrenia ([facility] [address] has a past surgical history that includes thoracotomy; Tonsillectomy; Colonoscopy (2014?); and Lung surgery (Right). Restrictions  Restrictions/Precautions  Restrictions/Precautions: General Precautions,Fall Risk,Up as Tolerated  Required Braces or Orthoses?: No  Position Activity Restriction  Other position/activity restrictions: Up with assist, IV, telemetry  Subjective   General  Chart Reviewed: Yes  Patient assessed for rehabilitation services?: Yes  Response to previous treatment: Patient with no complaints from previous session  Family / Caregiver Present: No  Subjective  Subjective: \"thanks for getting me to the chair, my lower back was killing me\"  General Comment  Comments: Pt sitting on EOB, pleasant and agreeable to OT eval.  Oxygen Therapy  O2 Device: None (Room air)   Orientation  Orientation  Overall Orientation Status: Within Functional Limits  Objective             Balance  Sitting Balance: Stand by assistance  Standing Balance: Minimal assistance  Functional Mobility  Functional - Mobility Device: Rolling Walker  Activity: Other  Assist Level: Moderate assistance (x2)  Functional Mobility Comments: Pt taking ~7 small steps forward, requiring extended seated rest break, then steps backward, side steps to chair, SOB and wheezing noted. RN in room and aware. VC throughout for sequencing, upright posture scnanign environment, breathing tech. Wheelchair [employer]  Equipment Used: Wheelchair; Other (Slideboard)  Level of Asssistance: Stand by assistance  Wheelchair Transfers Comments: To/from [facility] from bed with use of slideboard, min VC for safety with good carryover.   Bed mobility  Supine to Sit: Minimal assistance  Scooting: Minimal assistance  Comment: With HOB raised and use of bed rails  Transfers  Sit to stand: Moderate assistance;2 Person assistance from bed to RW; minAx1 from [facility] and chair to RW  Stand to sit: Minimum assistance  Transfer Comments: Max VC for RW safety, upright posture, initation, sequencing, controlled stand to sit, squaring self/AD up to surface and reaching back prior to sitting all to increase safety. Cognition  Overall Cognitive Status: Exceptions  Arousal/Alertness: Delayed responses to stimuli  Following Commands: Follows multistep commands with repitition; Follows multistep commands with increased time  Attention Span: Appears intact  Memory: Appears intact  Safety Judgement: Decreased awareness of need for assistance;Decreased awareness of need for safety  Problem Solving: Decreased awareness of errors;Assistance required to identify errors made;Assistance required to generate solutions;Assistance required to implement solutions;Assistance required to correct errors made  Insights: Decreased awareness of deficits  Initiation: Requires cues for some  Sequencing: Requires cues for some         Type of ROM/Therapeutic Exercise  Comment: Verbal and visual edu on postural correction exercise and benefits. Fair carryover, would benefit from continued edu.                     Plan   Plan  Times per week: 4-5x/wk 1x/day as Zain Burgess  Current Treatment Recommendations: Strengthening,Endurance Training,Balance Training,Functional Mobility Training,Safety Education & Training,Pain Management,Patient/Caregiver Education & Training,Equipment Evaluation, Education, & procurement,Self-Care / ADL,Home Management Training    AM-PAC Score        AM-PAC Inpatient Daily Activity Raw Score: 17 (01/06/22 1009)  AM-PAC Inpatient ADL T-Scale Score : 37.26 (01/06/22 1009)  ADL Inpatient CMS 0-100% Score: 50.11 (01/06/22 1009)  ADL Inpatient CMS G-Code Modifier : CK (01/06/22 1009)    Goals  Short term goals  Time Frame for Short term goals: By discharge, pt to demo  Short term goal 1: ADL transfers and functional mobility to CGA with use of AD as needed. Short term goal 2: bed mobility to SBA with use of bedrails as needed. Short term goal 3: I with simple B UE HEP to maintain strength for functional tasks to return home at prior level of function as able. Short term goal 4: toileting to CGA with use of AD/BSC/grab bars as needed. Short term goal 5: UB ADLs to SBA and LB ADLs to Min A with use of AD/AE as needed. Long term goals  Long term goal 1: Pt to be I with fall prevention education, EC/WS tech, recommendations for AE/discharge with use of handouts as needed. Long term goal 2: Pt to demo increased standing lilia > 8 min with Mod A using AD as needed to reduce risk of falls during functional tasks. Patient Goals   Patient goals : To feel better! Therapy Time   Individual Concurrent Group Co-treatment   Time In 4909         Time Out 8680 (Additional time: [id number])         Minutes 34              RN reports patient is medically stable for therapy treatment this date. Chart reviewed prior to treatment and patient is agreeable for therapy. All lines intact and patient positioned comfortably at end of treatment. All patient needs addressed prior to ending therapy session. Co-treatment with PT warranted secondary to decreased safety and independence requiring 2 skilled therapy professionals to address individual discipline's goals. OT addressing preparation for ADL transfer, sitting balance for increased ADL performance, sitting/activity tolerance, functional reaching, environmental safety/scanning, fall prevention, functional mobility for ADL transfers, ability to sequence and follow directions and functional UE strength.       CROW Disla

## 2022-01-06 NOTE — CONSULTS
PRN Rosas MD Meron        ipratropium-albuterol (DUONEB) nebulizer solution 1 ampule  1 ampule Inhalation Q4H PRN Teddy Freitas MD        topiramate (TOPAMAX) tablet 200 mg  200 mg Oral Nightly Rosas MD Meron   200 mg at 01/05/22 2143    OLANZapine (ZYPREXA) tablet 10 mg  10 mg Oral Nightly Teddy Freitas MD   10 mg at 01/05/22 2143    OLANZapine (ZYPREXA) tablet 5 mg  5 mg Oral Daily with breakfast Rosas MD Meron   5 mg at 01/05/22 0945    atorvastatin (LIPITOR) tablet 10 mg  10 mg Oral Nightly Rosas MD Mreon   10 mg at 01/05/22 2143    FLUoxetine (PROZAC) capsule 20 mg  20 mg Oral Daily with breakfast Rosas MD Meron   20 mg at 01/05/22 0945    hydrOXYzine (ATARAX) tablet 25 mg  25 mg Oral BID PRN Teddy Freitas MD        pregabalin (LYRICA) capsule 50 mg  50 mg Oral BID Rosas MD Meron   50 mg at 01/05/22 2143    budesonide-formoterol (SYMBICORT) 160-4.5 MCG/ACT inhaler 2 puff  2 puff Inhalation BID Rosas MD Meron   2 puff at 01/05/22 2129    tamsulosin (FLOMAX) capsule 0.4 mg  0.4 mg Oral Daily ROLANDO Marie - NP   0.4 mg at 01/05/22 2143    0.9 % sodium chloride infusion   IntraVENous Continuous ROLANDO Marie - NP 75 mL/hr at 01/06/22 0631 Rate Verify at 01/06/22 0631    sodium chloride flush 0.9 % injection 5-40 mL  5-40 mL IntraVENous 2 times per day Peter Mehta APRN - NP        sodium chloride flush 0.9 % injection 10 mL  10 mL IntraVENous PRN Peter Mehta APRN - NP        0.9 % sodium chloride infusion  25 mL IntraVENous PRN Peter Mehta APRN - NP        enoxaparin (LOVENOX) injection 30 mg  30 mg SubCUTAneous BID Peter Mehta APRN - NP   30 mg at 01/05/22 2142    ondansetron (ZOFRAN-ODT) disintegrating tablet 4 mg  4 mg Oral Q8H PRN Benjamine Tyson, APRN - NP        Or    ondansetron (ZOFRAN) injection 4 mg  4 mg IntraVENous Q6H PRN Benjamine Tyson, APRN - NP        magnesium hydroxide (MILK OF MAGNESIA) 400 MG/5ML suspension 30 mL 30 mL Oral Daily PRN Melecio Price APRN - NP        acetaminophen (TYLENOL) tablet 650 mg  650 mg Oral Q6H PRN Melecio Price APRN - NP   650 mg at 01/06/22 0211    Or    acetaminophen (TYLENOL) suppository 650 mg  650 mg Rectal Q6H PRN Melecio Price APRN - NP        cefTRIAXone (ROCEPHIN) 1000 mg IVPB in 50 mL D5W minibag  1,000 mg IntraVENous Q24H Melecio Price APRN - NP   Stopped at 01/05/22 1344    And    azithromycin (ZITHROMAX) tablet 500 mg  500 mg Oral Q24H Melecio Price APRN - NP   500 mg at 01/05/22 0945    albuterol (PROVENTIL) nebulizer solution 2.5 mg  2.5 mg Nebulization Q2H PRN Melecio Price APRN - NP        guaiFENesin [facility]) extended release tablet 600 mg  600 mg Oral BID Melecio Price APRN - NP   600 mg at 01/05/22 2143    pantoprazole (PROTONIX) tablet 40 mg  40 mg Oral QAM AC Melecio Price APRN - NP   40 mg at 01/06/22 4973         Allergies:  Depakote [divalproex sodium], Geodon [ziprasidone hcl], Haldol [haloperidol lactate], Stelazine [trifluoperazine], Trazodone, and Valproic acid    Social History:   Social History     Tobacco Use   Smoking Status Former Smoker    Packs/day: 1.50    Types: Cigarettes   Smokeless Tobacco Never Used     Social History     Substance and Sexual Activity   Alcohol Use No    Comment: denies     Social History     Substance and Sexual Activity   Drug Use No    Comment: denies       Family History:  Family History   Problem Relation Age of Onset    Stroke Mother     Alzheimer's Disease Mother     Alcohol Abuse Father     Other Father         vascular disease    Other Sister         Nervous breakdown         REVIEW OF SYSTEMS:  Gen: Negative for nausea, vomiting, diarrhea, fever, chills, night sweats  Heart: Negative for HTN, palpitations, chest pain  Lungs: Negative for wheezes, asthma or SOB  GI: Negative for nausea, vomiting  Endo: Negative for diabetes  Heme: Negative for DVT       PHYSICAL EXAM:  Patient Vitals for the past 24 hrs:   BP Temp Temp src Pulse Resp SpO2 Weight   01/06/22 0635 -- -- -- -- -- -- (!) 311 lb 6 oz (141.2 kg)   01/06/22 0457 118/65 97.5 °F (36.4 °C) Oral 64 18 97 % --   01/05/22 2348 122/67 98.4 °F (36.9 °C) Oral 65 16 95 % --   01/05/22 2130 -- -- -- -- -- 97 % --   01/05/22 2030 (!) 143/80 99.1 °F (37.3 °C) -- 72 16 97 % --   01/05/22 1601 (!) 163/90 97.7 °F (36.5 °C) Oral 73 16 94 % --   01/05/22 1150 127/85 97.5 °F (36.4 °C) Oral 57 16 96 % --   01/05/22 0735 136/63 97.5 °F (36.4 °C) Oral 64 18 95 % --     Gen: alert and oriented  Head: normorcephalic, atraumatic  Neck: supple  Heart: RRR  Lungs: No audible wheezes  Abdomen: soft  Pelvis: stable  Extremity no evidence of lateralizing radiculopathy or myopathy motor or sensory and reflex C5-S1. Thoracic hyperkyphosis, lumbar hyperlordosis. No gibbus or hairy patch deformity. Right and left upper extremity within normal limits. Shoulder, elbow, wrist, hand without edema, ecchymosis, crepitus, pain    Right left lower extremity also satisfactory. Hip, knee, ankle, foot without edema, ecchymosis, crepitus, pain    DATA:  CBC:   Lab Results   Component Value Date    WBC 17.9 01/04/2022    HGB 12.9 01/04/2022     01/04/2022     BMP:    Lab Results   Component Value Date     01/04/2022    K 3.5 01/04/2022     01/04/2022    CO2 20 01/04/2022    BUN 16 01/04/2022    CREATININE 1.11 01/04/2022    CALCIUM 8.3 01/04/2022    GLUCOSE 97 01/04/2022     PT/INR:  No results found for: PROTIME, INR  Troponin:  No results found for: [address]    Radiology:     Imaging reviewed. CT of the chest 1-3-2022 reviewed for bone windows. Multiple age indeterminate thoracic compression fractures identified including spontaneous anterior thoracic fusion at the apex deformity. Consequently I requested an MRI of the thoracic and lumbar spine without contrast.    1-5-2022 MRI confirms chronic thoracic fracture compression type T1, 2, 3, 5, 6, 7. Mild lumbar stenosis.   No oncology. No infection  ASSESSMENT:  Principal Problem:    Pneumonia  Active Problems:    [facility]    Gastroesophageal reflux disease    Vitamin D deficiency    Primary hypertension    Hypoxia    Neuropathy    Pathological fracture of thoracic vertebra due to secondary osteoporosis (HCC)    Age-related osteoporosis without current pathological fracture    SIRS (systemic inflammatory response syndrome) ([facility]  Resolved Problems:    * No resolved hospital problems. *       PLAN:  1) vitamin D 25-hydroxy level    2. Physical therapy for stenosis Home exercise program    3. Pain control    4 .nonoperative orthopedic care. We will follow closely with you.   Thank you very much for the consultation    Chris Guerrero MD

## 2022-01-06 NOTE — PROGRESS NOTES
Physical Therapy  Facility/Department: [facility] PROGRESSIVE CARE  Daily Treatment Note  NAME: Braulio Louis  : 1955  MRN: [id number]    Date of Service: 2022    Discharge Recommendations:  Patient would benefit from continued therapy after discharge        Assessment   Body structures, Functions, Activity limitations: Decreased functional mobility ; Decreased strength;Decreased safe awareness;Decreased balance;Decreased endurance  Assessment: pt tolerated treatment session well  Activity Tolerance  Activity Tolerance: Patient Tolerated treatment well     Patient Diagnosis(es): The encounter diagnosis was Pneumonia due to infectious organism, unspecified laterality, unspecified part of lung.     has a past medical history of Bipolar disorder ([facility] GERD (gastroesophageal reflux disease), Headache, Hiatal hernia, Hypertension, and Schizophrenia ([facility] has a past surgical history that includes thoracotomy; Tonsillectomy; Colonoscopy (2014?); and Lung surgery (Right). Restrictions  Restrictions/Precautions  Restrictions/Precautions: General Precautions,Fall Risk,Up as Tolerated  Required Braces or Orthoses?: No  Position Activity Restriction  Other position/activity restrictions: Up with assist, IV, telemetry  Subjective   General  Chart Reviewed: Yes  Family / Caregiver Present: No  Subjective  Subjective: Pt reporting feeling okay, wanting to get up. General Comment  Comments: pt supine in bed upon arrival  Pain Screening  Patient Currently in Pain: Yes  Pain Assessment  Pain Assessment: 0-10  Pain Level: 5  Patient's Stated Pain Goal: No pain  Pain Type: Acute pain  Pain Location: Hip  Pain Orientation: Left;Right  Pain Descriptors: Aching  Vital Signs  Patient Currently in Pain: Yes       Orientation     Cognition      Objective   Bed mobility  Scooting: Moderate assistance  Transfers  Transfers  Sit to stand:  Moderate assistance;2 Person assistance from bed to RW; minAx1 from [facility] and [facility] to sit: Minimum assistance  Transfer Comments: Max VC for RW safety, upright posture, initation, sequencing, controlled stand to sit, squaring self/AD up to surface and reaching back prior to sitting all to increase safety. Cognition  Overall Cognitive Status: Exceptions  Arousal/Alertness: Delayed responses to stimuli  Following Commands: Follows multistep commands with repitition; Follows multistep commands with increased time  Attention Span: Appears intact  Memory: Appears intact  Safety Judgement: Decreased awareness of need for assistance;Decreased awareness of need for safety  Problem Solving: Decreased awareness of errors;Assistance required to identify errors made;Assistance required to generate solutions;Assistance required to implement solutions;Assistance required to correct errors made  Insights: Decreased awareness of deficits  Sit to Stand: Moderate Assistance  Stand to sit: Moderate Assistance  Stand Pivot Transfers: Moderate Assistance  Ambulation  Ambulation?: Yes  Ambulation 1  Surface: level tile  Device: Rolling Walker  Assistance:  Moderate assistance;2 Person assistance  Quality of Gait: fair steadiness  Gait Deviations: Slow Fay;Decreased step length;Decreased step height;Shuffles  Distance: 20 ft     Balance  Posture: Fair  Sitting - Static: Good  Sitting - Dynamic: Good;-  Standing - Static: Fair;-  Standing - Dynamic: Fair;-  Exercises  Comments: pt instructed on postural ex           Comment: assisted pt to bedside chair              G-Code     OutComes Score                                                     AM-PAC Score  AM-PAC Inpatient Mobility Raw Score : 15 (01/06/22 1616)  AM-PAC Inpatient T-Scale Score : 39.45 (01/06/22 1616)  Mobility Inpatient CMS 0-100% Score: 57.7 (01/06/22 1616)  Mobility Inpatient CMS G-Code Modifier : CK (01/06/22 1616)          Goals  Short term goals  Time Frame for Short term goals: 12 visits  Short term goal 1: Pt to demonstrate bed mobility Nikki  Short term goal 2: Pt to perform STS transfers w/ RW Ed  Short term goal 3: Pt to ambulate at least 15ft w/ RW ModA  Short term goal 4: Pt to self propel wheelchair at least 50ft Nikki  Short term goal 5: Pt to actively participate in at least 30 minutes of physical therapy for ther act, ther ex, balance, gait, transfer, and endurance training  Patient Goals   Patient goals : To feel better    Plan    Plan  Times per week: 1-2x/day, 5-6x/week  Current Treatment Recommendations: Strengthening,Balance Training,Functional Mobility Training,Gait Training,Transfer Training,Endurance Training,Wheelchair Mobility Training,Safety Education & [employer] Exercise Program,Patient/Caregiver Education & Training,Equipment Evaluation, Education, & procurement,Neuromuscular Re-education  Safety Devices  Type of devices: Call light within reach,Chair alarm in place,Gait belt,Nurse notified,Left in chair  Restraints  Initially in place: No   Returned to see pt from [id number] for sliding board transfer from bed to chair pt able to complete with SBA safely  Therapy Time   cotreat Concurrent Group Co-treatment   Time In  910         Time Out  939         Minutes  34               Co-treatment with OT warranted secondary to decreased safety and independence requiring 2 skilled therapy professionals to address individual discipline's goals. PT addressing preparation for  Transfers, gait and pre gait activities,  sitting balance for increased  sitting/activity tolerance, functional mobility, environmental safety/scanning, fall prevention, functional mobility  transfers and functional LE strength. Upon writer exit, call light within reach, pt retired to bed. All lines intact and patient positioned comfortably. All patient needs addressed prior to ending therapy session. Chart reviewed prior to treatment and patient is agreeable for therapy. RN reports patient is medically stable for therapy treatment this date.        [address] Julio Cesar STEWART ERICKSON, PTA

## 2022-01-06 NOTE — CARE COORDINATION
Social Work-Spoke with Kylah from [facility]. They will do an onsite today. They are requesting that therapy call them regarding his progress. Discussed with therapy.  [place]

## 2022-01-06 NOTE — PROGRESS NOTES
[facility]  Office: [address], DO, Maria Esther Cornejonapoleon, DO, Kent Mohs, DO, Carlosjay Ojeda, DO, Supriya Marti MD, Brii Mistry MD, Silvia Crow MD, Luke Burnham MD, Dianelys Pabon MD, Linda Schneider MD, Bhaskar Rasmussen MD, Michelle Xie, DO, Noa Rain, DO, Fabio Grant MD,  Angel Romeo DO, Rigo Stubbs MD, Sanjana Morgan MD, Neville Mahoney MD, Felisa Rosado MD, Claribel Watson MD, Juan Ramon Beckett MD, Shekhar Pascual MD, Lis Bustamante, CNP, [facility], CNP, Ilan Peterson, CNP, Carlos Alberto Fried, CNS, Petty Hunter, CNP, Vianey Mullen, CNP, Nate Sommer, CNP, Sharee Peguero, CNP, Omar Moody CNP, Benny Benjamin PA-C, Anthony Metz DNP, Eladio Goldberg, DNP, Carmen Vanegas CNP, Rylie Orozco, CNP, Julius Bunn, CNP, Татьяна Donald, CNP, Estella Suarez CNP, Clemencia Amezquita, [facility]    Progress Note    1/6/2022    2:16 PM    Name:   Tex Mishra  MRN:     [id number]     [place]:      [de-identified]   Room:   [address] [place] [address] Day:  3  Admit Date:  1/3/2022  9:07 AM    PCP:   ROLANDO Maya CNP  Code Status:  Full Code    Subjective:     C/C:   Chief Complaint   Patient presents with    Shortness of Breath     Onset 2 hours, PTA. Interval History Status:   Feeling  improvement after coming to hospital  MRI thoracic spine showed chronic compression fracture changes, no  surgical intervention recommended by orthopedics  Breathing better  Vitamin D levels are low-17.7  Brief History:   Tex Mishra is a 78 y.o. Non- / non  male who presents with Shortness of Breath (Onset 2 hours, PTA. )   and is admitted to the hospital for the management of Pneumonia.     Patient reports he has progressive dyspnea for the last couple of months. This morning however, he woke up and felt he couldn't breathe. He also endorses non-productive cough and chest congestion.  He says one of the caregivers [place]at the [place] was just diagnosed with COVID. He reports he has had diarrhea, decreased appetite. He has had no fever, chills, or chest pain. He has chronic neuropathy in his hands and legs- uses a wheelchair to mobilize, although he says he is able to walk short distances. He is noted to have a PMH of bipolar disorder, schizophrenia, HTN, and GERD. He called EMS who brought him to the ED for evaluation.      While in the ED, he required bipap. He told the ED provider he has been vaccinated against COVID. He had a low grade temp and was tachycardic and tachypneic upon arrival. WBC elevated at 19.4, trop sl elevated. COVID swab was negative. He was given IV Rocephin and IV Zithromax while in the ED. CT of the chest revealed thoracic spine compression deformities, emphysema, and left upper lobe pneumonia.     He was able to be weaned from the bipap and placed on nasal cannula prior to going to the progressive unit. He is being admitted for further observation and management of pneumonia and hypoxia      Review of Systems:     Constitutional:  negative for chills, fevers, sweats  Respiratory: shortness of breath has improved, denies wheezing  Cardiovascular:  negative for chest pain, chest pressure/discomfort, lower extremity edema, palpitations  Gastrointestinal:  negative for abdominal pain, constipation, diarrhea, nausea, vomiting  Neurological:  negative for dizziness, headache    Medications: Allergies:     Allergies   Allergen Reactions    Depakote [Divalproex Sodium]     Geodon [Ziprasidone Hcl]     Haldol [Haloperidol Lactate]     Stelazine [Trifluoperazine]     Trazodone Other (See Comments)     Urinary incontinence     Valproic Acid      Other reaction(s): Liver Abnormalities  Jaundice        Current Meds:   Scheduled Meds:    topiramate  200 mg Oral Nightly    OLANZapine  10 mg Oral Nightly    OLANZapine  5 mg Oral Daily with breakfast    atorvastatin  10 mg Oral Nightly    FLUoxetine  20 mg Oral Daily with breakfast    pregabalin  50 mg Oral BID    budesonide-formoterol  2 puff Inhalation BID    tamsulosin  0.4 mg Oral Daily    sodium chloride flush  5-40 mL IntraVENous 2 times per day    enoxaparin  30 mg SubCUTAneous BID    cefTRIAXone (ROCEPHIN) IV  1,000 mg IntraVENous Q24H    guaiFENesin  600 mg Oral BID    pantoprazole  40 mg Oral QAM AC     Continuous Infusions:    sodium chloride 75 mL/hr at 22 1133    sodium chloride       PRN Meds: HYDROcodone 5 mg - acetaminophen, potassium chloride **OR** potassium alternative oral replacement **OR** potassium chloride, ipratropium-albuterol, hydrOXYzine, sodium chloride flush, sodium chloride, ondansetron **OR** ondansetron, magnesium hydroxide, acetaminophen **OR** acetaminophen, albuterol    Data:     Past Medical History:   has a past medical history of Bipolar disorder ([facility] GERD (gastroesophageal reflux disease), Headache, Hiatal hernia, Hypertension, and Schizophrenia ([facility] Social History:   reports that he has quit smoking. His smoking use included cigarettes. He smoked 1.50 packs per day. He has never used smokeless tobacco. He reports that he does not drink alcohol and does not use drugs. Family History:   Family History   Problem Relation Age of Onset    Stroke Mother     Alzheimer's Disease Mother     Alcohol Abuse Father     Other Father         vascular disease    Other Sister         Nervous breakdown       Vitals:  /63   Pulse 79   Temp 100.4 °F (38 °C) (Oral)   Resp 16   Ht 6' 3\" (1.905 m)   Wt (!) 311 lb 6 oz (141.2 kg)   SpO2 98%   BMI 38.92 kg/m²   Temp (24hrs), Av.5 °F (36.9 °C), Min:97.5 °F (36.4 °C), Max:100.4 °F (38 °C)    No results for input(s): POCGLU in the last 72 hours. I/O (24Hr):     Intake/Output Summary (Last 24 hours) at 2022 1416  Last data filed at 2022 1405  Gross per 24 hour   Intake 2666.64 ml   Output 1175 ml   Net 1491.64 ml       Labs:  Hematology:  Recent Labs     01/04/22  0601   WBC 17.9*   RBC 4.45   HGB 12.9*   HCT 40.2*   MCV 90.3   MCH 29.0   MCHC 32.1   RDW 14.6*      MPV 10.7     Chemistry:  Recent Labs     01/04/22  0601      K 3.5*   *   CO2 20   GLUCOSE 97   BUN 16   CREATININE 1.11   MG 2.1   ANIONGAP 11   LABGLOM >60   GFRAA >60   CALCIUM 8.3*     No results for input(s): PROT, LABALBU, LABA1C, Q2GFKAB, B9ITRWA, FT4, TSH, AST, ALT, LDH, GGT, ALKPHOS, LABGGT, BILITOT, BILIDIR, AMMONIA, AMYLASE, LIPASE, LACTATE, CHOL, HDL, LDLCHOLESTEROL, CHOLHDLRATIO, TRIG, VLDL, GAG40ND, PHENYTOIN, PHENYF, URICACID, POCGLU in the last 72 hours. ABG:No results found for: POCPH, PHART, PH, POCPCO2, KYR2GZS, PCO2, POCPO2, PO2ART, PO2, POCHCO3, LXM0IQX, HCO3, NBEA, PBEA, BEART, BE, THGBART, THB, OFV6EHA, QTXQ8ANU, D1SRSBXX, O2SAT, FIO2  Lab Results   Component Value Date/Time    SPECIAL LEFT HAND 2 CC 01/03/2022 09:37 AM     Lab Results   Component Value Date/Time    CULTURE NO GROWTH 3 DAYS 01/03/2022 09:37 AM       Radiology:  XR CHEST PORTABLE    Result Date: 1/4/2022  Increasing perihilar and ground-glass airspace opacities bilaterally. Pattern may represent worsening edema or pneumonitis. XR CHEST PORTABLE    Result Date: 1/3/2022  1. Mild scattered interstitial thickening, increased when compared to 06/07/2018, possibly interstitial edema, atypical/viral pneumonia, or fibrotic change. 2. Blunting at the right costophrenic angle, which could be related to a small pleural effusion or pleural scarring. CT CHEST PULMONARY EMBOLISM W CONTRAST    Result Date: 1/3/2022  1. No clear evidence for central pulmonary embolus. 2. Mild dependent atelectasis and respiratory motion throughout the lungs. Moderate emphysema. Mild left upper lobe infiltrate/probable pneumonia. Follow-up is recommended to document resolution. 3. Cholelithiasis. Fatty liver. 4. 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  5. Age-indeterminate compression deformities of T2, T3, T5, T7 and T11. Consider further evaluation with MRI thoracic spine to assess for acuity/marrow edema. 6. Atherosclerotic calcification of the aorta and branch vasculature. Coronary artery disease. Cardiomegaly.  RECOMMENDATIONS: Unavailable       Physical Examination:        General appearance:  alert, cooperative and no distress  Mental Status:  oriented to person, place and time and normal affect  Lungs:  + Surgical scar on back of right chest, diminished breath sounds at bases, no wheezing   Heart:  regular rate and rhythm, no murmur  Abdomen:  soft, nontender, nondistended, normal bowel sounds, no masses, hepatomegaly, splenomegaly  Extremities:  + edema, no redness, tenderness in the calves, has neuropathy of both hands and feet  Skin:  no gross lesions, rashes, induration    Assessment:        Hospital Problems           Last Modified POA    * (Principal) Pneumonia 1/3/2022 Yes    Schizoaffective disorder ([address] 1/6/2022 Yes    Gastroesophageal reflux disease 1/3/2022 Yes    Hypovitaminosis D 1/6/2022 Yes    Primary hypertension 1/3/2022 Yes    Hypoxia 1/3/2022 Yes    Neuropathy 1/3/2022 Yes    Pathological fracture of thoracic vertebra due to secondary osteoporosis ([address] 1/6/2022 Yes    Age-related osteoporosis without current pathological fracture 1/6/2022 Yes    SIRS (systemic inflammatory response syndrome) ([address] 1/6/2022 Yes    Lumbar stenosis without neurogenic claudication 1/6/2022 Yes    Thoracic compression fracture [facility]) T 2-T11 1/6/2022 Yes          Plan:        Continue IV antibiotics for pneumonia  No surgical intervention for thoracic compression deformities per orthopedics  Replace vitamin D  PT OT  Being evaluated for going to rehab      Luis Mckeon MD  1/6/2022  2:16 PM

## 2022-01-07 VITALS
TEMPERATURE: 97.5 F | WEIGHT: 311.13 LBS | HEART RATE: 66 BPM | SYSTOLIC BLOOD PRESSURE: 119 MMHG | RESPIRATION RATE: 16 BRPM | DIASTOLIC BLOOD PRESSURE: 76 MMHG | OXYGEN SATURATION: 98 % | BODY MASS INDEX: 38.69 KG/M2 | HEIGHT: 75 IN

## 2022-01-07 LAB
PNEUMOCOCCAL ANTIBODY TYPE 12: 1.78 UG/ML
PNEUMOCOCCAL ANTIBODY TYPE 14: 0.47 UG/ML
PNEUMOCOCCAL ANTIBODY TYPE 18: 7.34 UG/ML
PNEUMOCOCCAL ANTIBODY TYPE 19F: 6.98 UG/ML
PNEUMOCOCCAL ANTIBODY TYPE 1: 6.05 UG/ML
PNEUMOCOCCAL ANTIBODY TYPE 23: 2.35 UG/ML
PNEUMOCOCCAL ANTIBODY TYPE 3: 0.63 UG/ML
PNEUMOCOCCAL ANTIBODY TYPE 4: 0.15 UG/ML
PNEUMOCOCCAL ANTIBODY TYPE 5: 4.37 UG/ML
PNEUMOCOCCAL ANTIBODY TYPE 6B: 0.75 UG/ML
PNEUMOCOCCAL ANTIBODY TYPE 7F: 0.49 UG/ML
PNEUMOCOCCAL ANTIBODY TYPE 8: >22.85 UG/ML
PNEUMOCOCCAL ANTIBODY TYPE 9N: 0.62 UG/ML
PNEUMOCOCCAL ANTIBODY TYPE 9V: 2.94 UG/ML
PNEUMOCOCCAL INTERPRETATION: NORMAL

## 2022-01-07 PROCEDURE — 6370000000 HC RX 637 (ALT 250 FOR IP): Performed by: NURSE PRACTITIONER

## 2022-01-07 PROCEDURE — 97530 THERAPEUTIC ACTIVITIES: CPT

## 2022-01-07 PROCEDURE — 94761 N-INVAS EAR/PLS OXIMETRY MLT: CPT

## 2022-01-07 PROCEDURE — 2580000003 HC RX 258: Performed by: NURSE PRACTITIONER

## 2022-01-07 PROCEDURE — 6360000002 HC RX W HCPCS: Performed by: NURSE PRACTITIONER

## 2022-01-07 PROCEDURE — 94640 AIRWAY INHALATION TREATMENT: CPT

## 2022-01-07 PROCEDURE — 99239 HOSP IP/OBS DSCHRG MGMT >30: CPT | Performed by: INTERNAL MEDICINE

## 2022-01-07 PROCEDURE — 97535 SELF CARE MNGMENT TRAINING: CPT

## 2022-01-07 PROCEDURE — 6370000000 HC RX 637 (ALT 250 FOR IP): Performed by: INTERNAL MEDICINE

## 2022-01-07 RX ORDER — HYDROCODONE BITARTRATE AND ACETAMINOPHEN 5; 325 MG/1; MG/1
1 TABLET ORAL NIGHTLY
Qty: 6 TABLET | Refills: 0 | Status: SHIPPED | OUTPATIENT
Start: 2022-01-07 | End: 2022-01-19

## 2022-01-07 RX ORDER — GUAIFENESIN 600 MG/1
600 TABLET, EXTENDED RELEASE ORAL 2 TIMES DAILY
Qty: 14 TABLET | Refills: 0 | Status: SHIPPED | OUTPATIENT
Start: 2022-01-07 | End: 2022-01-14

## 2022-01-07 RX ORDER — AZITHROMYCIN 250 MG/1
250 TABLET, FILM COATED ORAL DAILY
Qty: 3 TABLET | Refills: 0 | Status: SHIPPED | OUTPATIENT
Start: 2022-01-07 | End: 2022-01-10

## 2022-01-07 RX ORDER — ERGOCALCIFEROL 1.25 MG/1
50000 CAPSULE ORAL WEEKLY
Qty: 5 CAPSULE | Refills: 1 | Status: SHIPPED | OUTPATIENT
Start: 2022-01-13

## 2022-01-07 RX ORDER — CEFUROXIME AXETIL 250 MG/1
250 TABLET ORAL 2 TIMES DAILY
Qty: 14 TABLET | Refills: 0 | Status: SHIPPED | OUTPATIENT
Start: 2022-01-07 | End: 2022-01-14

## 2022-01-07 RX ADMIN — ENOXAPARIN SODIUM 30 MG: 30 INJECTION SUBCUTANEOUS at 08:11

## 2022-01-07 RX ADMIN — PREGABALIN 50 MG: 50 CAPSULE ORAL at 08:12

## 2022-01-07 RX ADMIN — BUDESONIDE AND FORMOTEROL FUMARATE DIHYDRATE 2 PUFF: 160; 4.5 AEROSOL RESPIRATORY (INHALATION) at 08:25

## 2022-01-07 RX ADMIN — CEFTRIAXONE SODIUM 1000 MG: 1 INJECTION, POWDER, FOR SOLUTION INTRAMUSCULAR; INTRAVENOUS at 12:13

## 2022-01-07 RX ADMIN — OLANZAPINE 5 MG: 5 TABLET, FILM COATED ORAL at 08:12

## 2022-01-07 RX ADMIN — HYDROCODONE BITARTRATE AND ACETAMINOPHEN 1 TABLET: 5; 325 TABLET ORAL at 11:26

## 2022-01-07 RX ADMIN — FLUOXETINE 20 MG: 20 CAPSULE ORAL at 08:12

## 2022-01-07 RX ADMIN — PANTOPRAZOLE SODIUM 40 MG: 40 TABLET, DELAYED RELEASE ORAL at 05:54

## 2022-01-07 RX ADMIN — GUAIFENESIN 600 MG: 600 TABLET ORAL at 08:12

## 2022-01-07 ASSESSMENT — PAIN DESCRIPTION - ONSET
ONSET: ON-GOING
ONSET: ON-GOING

## 2022-01-07 ASSESSMENT — PAIN DESCRIPTION - PAIN TYPE
TYPE: CHRONIC PAIN
TYPE: CHRONIC PAIN

## 2022-01-07 ASSESSMENT — PAIN DESCRIPTION - FREQUENCY
FREQUENCY: CONTINUOUS
FREQUENCY: CONTINUOUS

## 2022-01-07 ASSESSMENT — PAIN DESCRIPTION - LOCATION
LOCATION: BACK
LOCATION: BACK

## 2022-01-07 ASSESSMENT — PAIN SCALES - WONG BAKER
WONGBAKER_NUMERICALRESPONSE: 0

## 2022-01-07 ASSESSMENT — PAIN DESCRIPTION - DESCRIPTORS
DESCRIPTORS: ACHING
DESCRIPTORS: ACHING

## 2022-01-07 ASSESSMENT — PAIN SCALES - GENERAL
PAINLEVEL_OUTOF10: 4
PAINLEVEL_OUTOF10: 4

## 2022-01-07 NOTE — CARE COORDINATION
Social Work-Notified daughter of patient's return to [facility]. She is agreeable. Updated Deborah-spoke with Sheryl at [facility].  [facility] will transport at [address]

## 2022-01-07 NOTE — CARE COORDINATION
Social Work-Spoke with Feli Ewing from [facility]. Patient is able to return there at [place].  Micki Warner

## 2022-01-07 NOTE — DISCHARGE SUMMARY
[facility]  Office: [address], DO, Alejo Mcdonnell, DO, Sai Koch, DO, Jose Ojeda, DO, Felisa Sood MD, Renea Strickland MD, Breezy Rossi MD, Kenney Kramer MD, Torri Ridley MD, Tex Mehta MD, Chuck Edge MD, Shelby Pitt DO, Grazyna Peñaloza DO, Chesley Saint, MD,  Glynda Fothergill, DO, Michelle Adams MD, Adriana Bustamante MD, Carl Betts MD, Cornelia Figueroa MD, Ollie Thao MD, Young Henson MD, Daisy Cote MD, Reina Can [facility], [facility], CNP, Dennie Hageman, CNP, Carolina Ortega, CNS, Dionicio Harrison, CNP, Nathan Kaminski, CNP, Ryan Esqueda, CNP, Karan Jaramillo, CNP, Amy Moore CNP, Cuauhtemoc Juarez PA-C, Pradip Solis, DNP, Rosa Miranda, AENL, Skyler Sharma, CNP, Dionisio Han, CNP, Amadou Montesinos, CNP, Brandon Negron CNP, Michelle Duffy [facility], Avelina Lamas, [facility]    Discharge Summary     Patient ID: Caroline Tsang  :  1955   MRN: [id number]     ACCOUNT:  [de-identified]   Patient's PCP: ORLANDO Seymour CNP  Admit Date: 1/3/2022   Discharge Date: 2022    Length of Stay: 4  Code Status:  Full Code  Admitting Physician: Ariella Mcnally MD  Discharge Physician: Ariella Mcnally MD     Active Discharge Diagnoses:     Hospital Problem Lists:  Principal Problem:    Pneumonia  Active Problems:    Schizoaffective disorder ([facility] [address]    Gastroesophageal reflux disease    Hypovitaminosis D    Primary hypertension    Hypoxia    Neuropathy    Pathological fracture of thoracic vertebra due to secondary osteoporosis ([facility] [address]    Age-related osteoporosis without current pathological fracture    SIRS (systemic inflammatory response syndrome) (HCC)    Lumbar stenosis without neurogenic claudication    Thoracic compression fracture ([facility] [address] T 2-T11  Resolved Problems:    * No resolved hospital problems.  *      Admission Condition:  poor     Discharged Condition: stable    Hospital Stay:   Admitting history:  Sae pereira 78 y.o. Non- / non  male who presents with Shortness of Breath (Onset 2 hours, PTA. )   and is admitted to the hospital for the management of Pneumonia.     Patient reports he has progressive dyspnea for the last couple of months. This morning however, he woke up and felt he couldn't breathe. He also endorses non-productive cough and chest congestion. He says one of the caregivers [place]at the [place] where [place] was just diagnosed with COVID. He reports he has had diarrhea, decreased appetite. He has had no fever, chills, or chest pain. He has chronic neuropathy in his hands and legs- uses a wheelchair to mobilize, although he says he is able to walk short distances. He is noted to have a PMH of bipolar disorder, schizophrenia, HTN, and GERD. He called EMS who brought him to the ED for evaluation.      While in the ED, he required bipap. He told the ED provider he has been vaccinated against COVID. He had a low grade temp and was tachycardic and tachypneic upon arrival. WBC elevated at 19.4, trop sl elevated. COVID swab was negative. He was given IV Rocephin and IV Zithromax while in the ED. CT of the chest revealed thoracic spine compression deformities, emphysema, and left upper lobe pneumonia.     He was able to be weaned from the bipap and placed on nasal cannula prior to going to the progressive unit.  He is being admitted for further observation and management of pneumonia and hypoxia     Hospital Course:    Feeling  improvement after coming to hospital  MRI thoracic spine showed chronic compression fracture changes, no  surgical intervention recommended by orthopedics  Breathing better  Oral Vitamin D ordered for levels low-17.7     Plan:         Switch IV antibiotics for pneumonia,  to oral   No surgical intervention for thoracic compression deformities per orthopedics  Replace vitamin D orally  PT OT  Discharge to ECF/rehab        Significant therapeutic interventions: See above notes    Significant Diagnostic Studies:   Labs / Micro:  CBC:   Lab Results   Component Value Date    WBC 17.9 01/04/2022    RBC 4.45 01/04/2022    HGB 12.9 01/04/2022    HCT 40.2 01/04/2022    MCV 90.3 01/04/2022    MCH 29.0 01/04/2022    MCHC 32.1 01/04/2022    RDW 14.6 01/04/2022     01/04/2022     BMP:    Lab Results   Component Value Date    GLUCOSE 97 01/04/2022     01/04/2022    K 3.5 01/04/2022     01/04/2022    CO2 20 01/04/2022    ANIONGAP 11 01/04/2022    BUN 16 01/04/2022    CREATININE 1.11 01/04/2022    BUNCRER 14 01/04/2022    CALCIUM 8.3 01/04/2022    LABGLOM >60 01/04/2022    GFRAA >60 01/04/2022    GFR      01/04/2022    GFR NOT REPORTED 01/04/2022     HFP:    Lab Results   Component Value Date    PROT 7.8 01/03/2022     CMP:    Lab Results   Component Value Date    GLUCOSE 97 01/04/2022     01/04/2022    K 3.5 01/04/2022     01/04/2022    CO2 20 01/04/2022    BUN 16 01/04/2022    CREATININE 1.11 01/04/2022    ANIONGAP 11 01/04/2022    ALKPHOS 80 01/03/2022    ALT 24 01/03/2022    AST 19 01/03/2022    BILITOT 0.85 01/03/2022    LABALBU 4.2 01/03/2022    ALBUMIN NOT REPORTED 01/03/2022    LABGLOM >60 01/04/2022    GFRAA >60 01/04/2022    GFR      01/04/2022    GFR NOT REPORTED 01/04/2022    PROT 7.8 01/03/2022    CALCIUM 8.3 01/04/2022     PT/INR:  No results found for: PTINR, PROTIME, INR  PTT: No results found for: APTT  FLP:    Lab Results   Component Value Date    CHOL 119 06/09/2018    TRIG 159 06/09/2018    HDL 27 06/09/2018     U/A:    Lab Results   Component Value Date    COLORU YELLOW 06/15/2018    TURBIDITY CLEAR 06/15/2018    SPECGRAV 1.017 06/15/2018    HGBUR NEGATIVE 06/15/2018    PHUR 5.5 06/15/2018    PROTEINU NEGATIVE 06/15/2018    GLUCOSEU TRACE 06/15/2018    KETUA LARGE 06/15/2018    BILIRUBINUR  06/15/2018     Presumptive positive. Unable to confirm due to unavailability of reagent.     UROBILINOGEN Normal 06/15/2018    NITRU NEGATIVE 06/15/2018    LEUKOCYTESUR NEGATIVE 06/15/2018     TSH:    Lab Results   Component Value Date    TSH 2.52 06/09/2018        Radiology:  MRI THORACIC SPINE WO CONTRAST    Result Date: 1/5/2022  Lumbar spine MRI: No compression fracture. At L3-L4, mild canal stenosis. Other mild degenerative changes of lumbar spine as described. Thoracic spine MRI: Chronic compression fractures of T11, T8, T7, T6, T5, T1 and T2 and T3 with no retropulsion. The greatest amount of height loss is noted within the T7 and T5 vertebral bodies with the 80% and 50% height loss respectively as described. No acute compression fracture. No high-grade canal or foraminal stenosis. No nerve impingement. No significant posterior disc pathology. Small bilateral pleural effusion. RECOMMENDATIONS: Unavailable     MRI LUMBAR SPINE WO CONTRAST    Result Date: 1/5/2022  Lumbar spine MRI: No compression fracture. At L3-L4, mild canal stenosis. Other mild degenerative changes of lumbar spine as described. Thoracic spine MRI: Chronic compression fractures of T11, T8, T7, T6, T5, T1 and T2 and T3 with no retropulsion. The greatest amount of height loss is noted within the T7 and T5 vertebral bodies with the 80% and 50% height loss respectively as described. No acute compression fracture. No high-grade canal or foraminal stenosis. No nerve impingement. No significant posterior disc pathology. Small bilateral pleural effusion. RECOMMENDATIONS: Unavailable     XR CHEST PORTABLE    Result Date: 1/4/2022  Increasing perihilar and ground-glass airspace opacities bilaterally. Pattern may represent worsening edema or pneumonitis. XR CHEST PORTABLE    Result Date: 1/3/2022  1. Mild scattered interstitial thickening, increased when compared to 06/07/2018, possibly interstitial edema, atypical/viral pneumonia, or fibrotic change. 2. Blunting at the right costophrenic angle, which could be related to a small pleural effusion or pleural scarring. CT CHEST PULMONARY EMBOLISM W CONTRAST    Result Date: 1/3/2022  1. No clear evidence for central pulmonary embolus. 2. Mild dependent atelectasis and respiratory motion throughout the lungs. Moderate emphysema. Mild left upper lobe infiltrate/probable pneumonia. Follow-up is recommended to document resolution. 3. Cholelithiasis. Fatty liver. 4. 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 5. Age-indeterminate compression deformities of T2, T3, T5, T7 and T11. Consider further evaluation with MRI thoracic spine to assess for acuity/marrow edema. 6. Atherosclerotic calcification of the aorta and branch vasculature. Coronary artery disease. Cardiomegaly. RECOMMENDATIONS: Unavailable       Consultations:    Consults:     Final Specialist Recommendations/Findings:   IP CONSULT TO HOSPITALIST  IP CONSULT TO ORTHOPEDIC SURGERY      The patient was seen and examined on day of discharge and this discharge summary is in conjunction with any daily progress note from day of discharge. Discharge plan:     Disposition: [facility]    Physician Follow Up:     Tyrone Levine MD  [address] [facility] [address] [facility]  [phone]    In 1 month      ROLANDO Ng - [place] [address]  [phone]    In 1 week         Requiring Further Evaluation/Follow Up POST HOSPITALIZATION/Incidental Findings:  Follow-up with PCP within 1 week    Diet: cardiac diet    Activity: As tolerated    Instructions to Patient: PT OT    Discharge Medications:      Medication List      START taking these medications    azithromycin 250 MG tablet  Commonly known as: ZITHROMAX  Take 1 tablet by mouth daily for 3 days     cefUROXime 250 MG tablet  Commonly known as: CEFTIN  Take 1 tablet by mouth 2 times daily for 7 days     guaiFENesin 600 MG extended release tablet  Commonly known as: MUCINEX  Take 1 tablet by mouth 2 times daily for 7 days     vitamin D 1.25 MG (15813 UT) Caps capsule  Commonly known as: ERGOCALCIFEROL  Take 1 capsule by mouth once a week  Start taking on: January 13, 2022        CHANGE how you take these medications    * OLANZapine 5 MG tablet  Commonly known as: ZYPREXA  What changed: Another medication with the same name was removed. Continue taking this medication, and follow the directions you see here. * OLANZapine 10 MG tablet  Commonly known as: ZYPREXA  What changed: Another medication with the same name was removed. Continue taking this medication, and follow the directions you see here. tamsulosin 0.4 MG capsule  Commonly known as: FLOMAX  Take 1 capsule by mouth daily  What changed: when to take this         * This list has 2 medication(s) that are the same as other medications prescribed for you. Read the directions carefully, and ask your doctor or other care provider to review them with you. CONTINUE taking these medications    atorvastatin 10 MG tablet  Commonly known as: LIPITOR     bisacodyl 10 MG suppository  Commonly known as: DULCOLAX     cyclobenzaprine 10 MG tablet  Commonly known as: FLEXERIL     FLUoxetine 20 MG capsule  Commonly known as: PROZAC     fluticasone-salmeterol 250-50 MCG/DOSE Aepb  Commonly known as: ADVAIR     furosemide 40 MG tablet  Commonly known as: LASIX     HYDROcodone-acetaminophen 5-325 MG per tablet  Commonly known as: NORCO  Take 1 tablet by mouth nightly for 12 days.      hydrOXYzine 25 MG tablet  Commonly known as: ATARAX     magnesium hydroxide 400 MG/5ML suspension  Commonly known as: MILK OF MAGNESIA     oxybutynin 5 MG tablet  Commonly known as: DITROPAN     pregabalin 50 MG capsule  Commonly known as: LYRICA     topiramate 200 MG tablet  Commonly known as: TOPAMAX        STOP taking these medications    levocetirizine 5 MG tablet  Commonly known as: XYZAL     loperamide 2 MG capsule  Commonly known as: IMODIUM     traZODone 50 MG tablet  Commonly known as: DESYREL           Where to Get Your Medications      These medications were sent to [address] [facility] [address] [facility] [address], [address] [facility] [address]    Phone: [phone]   azithromycin 250 MG tablet  cefUROXime 250 MG tablet  guaiFENesin 600 MG extended release tablet  vitamin D 1.25 MG (65726 UT) Caps capsule     You can get these medications from any pharmacy    Bring a paper prescription for each of these medications  HYDROcodone-acetaminophen 5-325 MG per tablet         No discharge procedures on file. Time Spent on discharge is  35 mins in patient examination, evaluation, counseling as well as medication reconciliation, prescriptions for required medications, discharge plan and follow up. Electronically signed by   Divina Dumas MD  1/7/2022  4:15 PM      Thank you ROLANDO Mathur - MARLA for the opportunity to be involved in this patient's care.

## 2022-01-07 NOTE — PROGRESS NOTES
Progress Note    1/7/2022 11:11 AM  Subjective:   Admit Date: 1/3/2022  PCP: ROLANDO Mcfarland CNP  Date of Discharge: per intermed    Medications:   Scheduled Meds:   vitamin D  50,000 Units Oral Weekly    topiramate  200 mg Oral Nightly    OLANZapine  10 mg Oral Nightly    OLANZapine  5 mg Oral Daily with breakfast    atorvastatin  10 mg Oral Nightly    FLUoxetine  20 mg Oral Daily with breakfast    pregabalin  50 mg Oral BID    budesonide-formoterol  2 puff Inhalation BID    tamsulosin  0.4 mg Oral Daily    sodium chloride flush  5-40 mL IntraVENous 2 times per day    enoxaparin  30 mg SubCUTAneous BID    cefTRIAXone (ROCEPHIN) IV  1,000 mg IntraVENous Q24H    guaiFENesin  600 mg Oral BID    pantoprazole  40 mg Oral QAM AC     Continuous Infusions:   sodium chloride 75 mL/hr at 01/07/22 0555    sodium chloride       PRN Meds:HYDROcodone 5 mg - acetaminophen, potassium chloride **OR** potassium alternative oral replacement **OR** potassium chloride, ipratropium-albuterol, hydrOXYzine, sodium chloride flush, sodium chloride, ondansetron **OR** ondansetron, magnesium hydroxide, acetaminophen **OR** acetaminophen, albuterol    Diet:   Diet: ADULT DIET; Regular    Subjective:   Systemic or Specific Complaints:Pain Control    Objective:     Patient Vitals for the past 24 hrs:   BP Temp Temp src Pulse Resp SpO2 Weight   01/07/22 0828 -- -- -- -- -- 95 % --   01/07/22 0808 (!) 154/88 97.7 °F (36.5 °C) Oral 72 16 95 % --   01/07/22 0545 -- -- -- -- -- -- (!) 311 lb 2 oz (141.1 kg)   01/07/22 0413 (!) 139/92 98.4 °F (36.9 °C) Oral 66 16 95 % --   01/07/22 0005 (!) 124/90 98.2 °F (36.8 °C) -- 63 20 91 % --   01/06/22 1953 136/80 98.1 °F (36.7 °C) Oral 73 16 96 % --   01/06/22 1553 112/71 97.7 °F (36.5 °C) Oral 65 16 98 % --   01/06/22 1253 104/63 100.4 °F (38 °C) Oral 79 16 98 % --     I/O last 3 completed shifts:   In: 3816.2 [P.O.:480; I.V.:3286.2; IV Piggyback:50]  Out: 1925 [LDLRI:7663]  No intake/output data recorded. General: alert, appears stated age, cooperative, distracted, fatigued, mild distress and moderately obese   Wound: Motion: Painful range of Motion in affected extremity   DVT Exam: No evidence of DVT seen on physical exam.  Negative Pastora's sign. No cords or calf tenderness. Additional exam:     Data Review  CBC: No results for input(s): WBC, HGB, PLT in the last 72 hours. BMP:  No results for input(s): NA, K, CL, CO2, BUN, CREATININE, GLUCOSE in the last 72 hours. Hepatic: No results for input(s): AST, ALT, ALB, BILITOT, ALKPHOS in the last 72 hours. Troponin: No results for input(s): TROPONINI in the last 72 hours. BNP: No results for input(s): BNP in the last 72 hours. Lipids: No results for input(s): CHOL, HDL in the last 72 hours. Invalid input(s): LDLCALCU  INR: No results for input(s): INR in the last 72 hours. Assessment:   Wil Trinh has slightly improved from yesterday. Pain is well controlled. He has no nausea and no vomiting. Current activity is up with assistance  Incision:       Plan:      1:  Vit D 17, agree with 50, 000 IU replacement for bone health  2:  Continue Deep venous thrombosis prophylaxis  3:  Continue Pain Control  4.  Ortho followup one month      Electronically signed by Chris Guerrero MD on 1/7/2022 at 11:11 AM

## 2022-01-07 NOTE — PLAN OF CARE
Problem: Falls - Risk of:  Goal: Will remain free from falls  Description: Will remain free from falls  1/7/2022 1400 by Sunday Davies RN  Outcome: Ongoing  1/7/2022 0034 by Naila Dangelo RN  Outcome: Ongoing  Goal: Absence of physical injury  Description: Absence of physical injury  1/7/2022 1400 by Sunday Davies RN  Outcome: Ongoing  1/7/2022 0034 by Naila Dangelo RN  Outcome: Ongoing     Problem: Pain:  Goal: Pain level will decrease  Description: Pain level will decrease  1/7/2022 1400 by Sunday Davies RN  Outcome: Ongoing  1/7/2022 0034 by Naila Dangelo RN  Outcome: Ongoing  Goal: Control of acute pain  Description: Control of acute pain  1/7/2022 1400 by Sunday Davies RN  Outcome: Ongoing  1/7/2022 0034 by Naila Dangelo RN  Outcome: Ongoing  Goal: Control of chronic pain  Description: Control of chronic pain  1/7/2022 1400 by Sunday Davies RN  Outcome: Ongoing  1/7/2022 0034 by Naila Dangelo RN  Outcome: Ongoing  Note: Pain level assessment complete.    Patient educated on pain scale and control interventions  PRN pain medication given per patient request  Patient instructed to call out with new onset of pain or unrelieved pain

## 2022-01-07 NOTE — PROGRESS NOTES
Occupational Therapy  Facility/Department: [facility] PROGRESSIVE CARE  Daily Treatment Note  NAME: Ganesh Delgadillo  : 1955  MRN: [id number]    Date of Service: 2022   ELDON Almaraz reports patient is medically stable for therapy treatment this date. Chart reviewed prior to treatment and patient is agreeable for therapy. All lines intact and patient positioned comfortably at end of treatment. All patient needs addressed prior to ending therapy session. Discharge Recommendations:  Patient would benefit from continued therapy after discharge  OT Equipment Recommendations  ADL Assistive Devices: Emergency Alert System;Long-handled Shoe Horn;Long-handled Sponge;Reacher   Due to recent hospitalization and medical condition, pt would benefit from additional therapy at time of discharge to ensure safety. Please refer to the AM-PAC score for current functional status. Assessment   Performance deficits / Impairments: Decreased functional mobility ; Decreased ADL status; Decreased strength;Decreased endurance;Decreased high-level IADLs;Decreased fine motor control;Decreased safe awareness;Decreased cognition;Decreased balance;Decreased posture  Assessment: Pt would benefit from continued skilled OT services to address deficits in areas of functional balance, functional reach, ADL completion, safety awareness, transfers, UE strength and functional mobility, all limiting safe return home to [facility]. Prognosis: Good  OT Education: OT Role;Plan of Care;Transfer Training;Energy Conservation;Precautions; Equipment  Patient Education: slow/controlled breathing, postural control, proper transfer with slide board, pacing, benefits of sitting upright  REQUIRES OT FOLLOW UP: Yes  Activity Tolerance  Activity Tolerance: Patient Tolerated treatment well  Activity Tolerance: Fair +, pt very pleasant throughout and appreciative  Safety Devices  Safety Devices in place: Yes  Type of devices: Nurse notified;Gait belt;Call light within reach; Patient at risk for falls (left in w/c)         Patient Diagnosis(es): The primary encounter diagnosis was Pneumonia of left upper lobe due to infectious organism. Diagnoses of Pneumonia due to infectious organism, unspecified laterality, unspecified part of lung and Pathological fracture of thoracic vertebra due to secondary osteoporosis [facility]) were also pertinent to this visit. has a past medical history of Bipolar disorder ([facility] [address] GERD (gastroesophageal reflux disease), Headache, Hiatal hernia, Hypertension, and Schizophrenia ([facility] [address] has a past surgical history that includes thoracotomy; Tonsillectomy; Colonoscopy (2014?); and Lung surgery (Right). Restrictions  Restrictions/Precautions  Restrictions/Precautions: General Precautions,Fall Risk,Up as Tolerated  Required Braces or Orthoses?: No  Position Activity Restriction  Other position/activity restrictions: Up with assist, IV, telemetry  Subjective   General  Chart Reviewed: Yes  Patient assessed for rehabilitation services?: Yes  Response to previous treatment: Patient with no complaints from previous session  Family / Caregiver Present: No  Subjective  General Comment  Comments: Pt supine in bed agreeable to OT treatment      Orientation  Orientation  Overall Orientation Status: Within Functional Limits  Objective             Balance  Sitting Balance: Minimal assistance (Intially required Min A for trunk support but eventually was able to sit up with SBA)  Standing Balance  Comment: Pt declining standing at this time and wanting to use slide board to transfer to w/c  Bed mobility  Supine to Sit: Minimal assistance  Sit to Supine: Unable to assess (pt wanted to sit up in w/c)  Scooting: Contact guard assistance  Comment: Pt able to progress BLEs OOB and Min A for assisting trunk upward. Pt able to slowly scoot self out to EOB using UEs on bed and with bed rail. Pt required increased time/assistance.  Pt not able to breath through nose.  Transfers  Slide Board: Minimal assistance  Transfer Comments: Pt used slide board to transfer to w/c. Reports that is what he usually does. Min A required to assist with hold board down on w/c. Pt attempted x3 d/t brief getting stuck on slide board and not being able to fully transfer until third attempt. Mod vc's for upright posture, pacing, sequencing, and awareness/assist with lines for increased safety/decreased risk of falls. Cognition  Overall Cognitive Status: Exceptions  Arousal/Alertness: Delayed responses to stimuli  Following Commands: Follows multistep commands with repitition; Follows multistep commands with increased time  Attention Span: Appears intact  Memory: Appears intact  Safety Judgement: Decreased awareness of need for assistance;Decreased awareness of need for safety  Problem Solving: Decreased awareness of errors;Assistance required to identify errors made;Assistance required to generate solutions;Assistance required to implement solutions;Assistance required to correct errors made  Insights: Decreased awareness of deficits  Initiation: Requires cues for some  Sequencing: Requires cues for some                                         Plan   Plan  Times per week: 4-5x/wk 1x/day as lilia  Current Treatment Recommendations: Strengthening,Endurance Training,Balance Training,Functional Mobility Training,Safety Education & Training,Pain Management,Patient/Caregiver Education & Training,Equipment Evaluation, Education, & procurement,Self-Care / ADL,Home Management Training                                                  AM-PAC Score        AM-[facility] Inpatient Daily Activity Raw Score: 17 (01/07/22 1344)  AM-PAC Inpatient ADL T-Scale Score : 37.26 (01/07/22 1344)  ADL Inpatient CMS 0-100% Score: 50.11 (01/07/22 1344)  ADL Inpatient CMS G-Code Modifier : CK (01/07/22 1344)    Goals  Short term goals  Time Frame for Short term goals: By discharge, pt to demo  Short term goal 1: ADL transfers and functional mobility to CGA with use of AD as needed. Short term goal 2: bed mobility to SBA with use of bedrails as needed. Short term goal 3: I with simple B UE HEP to maintain strength for functional tasks to return home at prior level of function as able. Short term goal 4: toileting to CGA with use of AD/BSC/grab bars as needed. Short term goal 5: UB ADLs to SBA and LB ADLs to Min A with use of AD/AE as needed. Long term goals  Long term goal 1: Pt to be I with fall prevention education, EC/WS tech, recommendations for AE/discharge with use of handouts as needed. Long term goal 2: Pt to demo increased standing lilia > 8 min with Mod A using AD as needed to reduce risk of falls during functional tasks. Patient Goals   Patient goals : To feel better!        Therapy Time   Individual Concurrent Group Co-treatment   Time In 1123         Time Out [facility]

## 2022-01-07 NOTE — PLAN OF CARE
Problem: Skin Integrity:  Goal: Will show no infection signs and symptoms  Description: Will show no infection signs and symptoms  Outcome: Ongoing  Goal: Absence of new skin breakdown  Description: Absence of new skin breakdown  Outcome: Ongoing     Problem: Falls - Risk of:  Goal: Will remain free from falls  Description: Will remain free from falls  1/7/2022 0034 by Carin Jeong RN  Outcome: Ongoing     Problem: Falls - Risk of:  Goal: Absence of physical injury  Description: Absence of physical injury  1/7/2022 0034 by Carin Jeong RN  Outcome: Ongoing     Problem: Pain:  Goal: Pain level will decrease  Description: Pain level will decrease  1/7/2022 0034 by Carin Jeong RN  Outcome: Ongoing     Problem: Pain:  Goal: Control of acute pain  Description: Control of acute pain  1/7/2022 0034 by Carin Jeong RN  Outcome: Ongoing     Problem: Pain:  Goal: Control of chronic pain  Description: Control of chronic pain  1/7/2022 0034 by Carin Jeong RN  Outcome: Ongoing  Note: Pain level assessment complete.    Patient educated on pain scale and control interventions  PRN pain medication given per patient request  Patient instructed to call out with new onset of pain or unrelieved pain

## 2022-01-07 NOTE — PROGRESS NOTES
Physical Therapy  Facility/Department: [facility]  Daily Treatment Note  NAME: Dayron Gresham  : 1955  MRN: [id number]    Date of Service: 2022    Discharge Recommendations:  Patient would benefit from continued therapy after discharge   Pt currently functioning below baseline. Would suggest additional therapy at time of discharge to maximize long term outcomes and prevent re-admission. Please refer to AM-PAC score for current level of function. PT Equipment Recommendations  Equipment Needed: No    Assessment   Body structures, Functions, Activity limitations: Decreased functional mobility ; Decreased strength;Decreased safe awareness;Decreased balance;Decreased endurance  Assessment: Paitent able to perform slideboard to W/C with CGA-Min A for safety and set up. Patient progressing toward PLOF and will requried continue skille PT services. Prognosis: Good  Decision Making: Medium Complexity  PT Education: Goals;PT Role;Plan of Care;General Safety; Energy Conservation;Transfer Training;Functional Mobility Training  Patient Education: Pt educated on: purpose of acute PT eval, general safety awareness, safe transfers w/ RW, pursed lip breathing, and PT POC. Pt verbalized fair understanding. REQUIRES PT FOLLOW UP: Yes  Activity Tolerance  Activity Tolerance: Patient Tolerated treatment well     Patient Diagnosis(es): The primary encounter diagnosis was Pneumonia of left upper lobe due to infectious organism. Diagnoses of Pneumonia due to infectious organism, unspecified laterality, unspecified part of lung and Pathological fracture of thoracic vertebra due to secondary osteoporosis [facility]) were also pertinent to this visit. has a past medical history of Bipolar disorder ([address] GERD (gastroesophageal reflux disease), Headache, Hiatal hernia, Hypertension, and Schizophrenia ([address] has a past surgical history that includes thoracotomy;  Tonsillectomy; Colonoscopy (2014?); and Lung surgery (Right). Restrictions  Restrictions/Precautions  Restrictions/Precautions: General Precautions,Fall Risk,Up as Tolerated  Required Braces or Orthoses?: No  Position Activity Restriction  Other position/activity restrictions: Up with assist, IV, telemetry  Subjective   General  Chart Reviewed: Yes  Response To Previous Treatment: Patient with no complaints from previous session. Family / Caregiver Present: No  Subjective  Subjective: Pt reporting feeling okay, wanting to get up. General Comment  Comments: RN reported patient medically stable for PT treatment. Orientation  Orientation  Overall Orientation Status: Within Functional Limits  Cognition   Cognition  Overall Cognitive Status: Exceptions  Arousal/Alertness: Delayed responses to stimuli  Following Commands: Follows multistep commands with repitition; Follows multistep commands with increased time  Attention Span: Appears intact  Memory: Appears intact  Safety Judgement: Decreased awareness of need for assistance;Decreased awareness of need for safety  Problem Solving: Decreased awareness of errors;Assistance required to identify errors made;Assistance required to generate solutions;Assistance required to implement solutions;Assistance required to correct errors made  Insights: Decreased awareness of deficits  Initiation: Requires cues for some  Sequencing: Requires cues for some  Objective   Bed mobility  Supine to Sit: Minimal assistance  Scooting: Minimal assistance  Comment: With HOB raised and use of bed rails. Transfers  Comment: Patient used sliding board to transfer over to the W/C, attempted 3 times due to brief getting stuck on sliding board. Able to safety Patient significantly SOB but reports he is a mouth breathing and will recovery after a few mins. Reporting this is normal for him.   Ambulation  Ambulation?: No  More Ambulation?: No     Balance  Posture: Fair  Exercises  Comments: Patient performing seated LE x 5-8 reps with limited ROM.     AM-PAC Score  AM-PAC Inpatient Mobility Raw Score : 14 (01/07/22 1406)  AM-PAC Inpatient T-Scale Score : 38.1 (01/07/22 1406)  Mobility Inpatient CMS 0-100% Score: 61.29 (01/07/22 1406)  Mobility Inpatient CMS G-Code Modifier : CL (01/07/22 1406)          Goals  Short term goals  Time Frame for Short term goals: 12 visits  Short term goal 1: Pt to demonstrate bed mobility Nikki  Short term goal 2: Pt to perform STS transfers w/ RW Ed  Short term goal 3: Pt to ambulate at least 15ft w/ RW ModA  Short term goal 4: Pt to self propel wheelchair at least 50ft Nikki  Short term goal 5: Pt to actively participate in at least 30 minutes of physical therapy for ther act, ther ex, balance, gait, transfer, and endurance training  Patient Goals   Patient goals : To feel better    Plan    Plan  Times per week: 1-2x/day, 5-6x/week  Current Treatment Recommendations: Strengthening,Balance Training,Functional Mobility Training,Gait Training,Transfer Training,Endurance Training,Wheelchair Mobility Training,Safety Education & [employer] Exercise Program,Patient/Caregiver Education & Training,Equipment Evaluation, Education, & procurement,Neuromuscular Re-education  Safety Devices  Type of devices: Call light within reach,Chair alarm in place,Gait belt,Nurse notified,Left in chair  Restraints  Initially in place: No     Therapy Time   Individual Concurrent Group Co-treatment   Time In       18   Time Out       18   Minutes       27        Co-treatment with OT warranted secondary to decreased safety and independence requiring 2 skilled therapy professionals to address individual discipline's goals. PT addressing pre gait trunk strengthening, weight shifting prior to transfers, transfer training and postural control in sitting, sliding transfer safety.     Ethan Johansen, PTA

## 2022-01-07 NOTE — PROGRESS NOTES
[facility]  Office: [address], DO, Hollie March, DO, Everett Chambers, DO, Petey Braeden Blood, DO, Hayde Holden MD, Rome Lopez MD, Jazmin Beckett MD, Gilmar Marrero MD, Santosh Durand MD, Naren Kunz MD, Teofilo Mccain MD, Slime Combs, DO, Osiris Lange, DO, Chandan Sahu MD,  Carine Vale, DO, Dorene Silverman MD, Violette Gray MD, Kassy Wilkins MD, Aggie Chavez MD, Fernanda Mcdonald MD, Laith Jorgensen MD, Radha Troy MD, Irvin Perez, [facility], [facility], CNP, Michael Olvera, CNP, [facility], CNS, Bertha Patricia, CNP, Tevin Chin, CNP, Nancie Waters, CNP, Tania Wong, CNP, Nicole Garland, CNP, Kiel Marley PA-C, Kyle Sandoval DNP, Claudette Anis, [facility] [place], Dennis Dyson, CNP, Devonte Adame, CNP, [facility], CNP, Bailey Walker CNP, Deuce Felix, [facility], Chan Darden    Progress Note    1/7/2022    12:45 PM    Name:   Ganesh Delgadillo  MRN:     [id number]     [place]:      [de-identified]   Room:   [address] [place] [address] Day:  4  Admit Date:  1/3/2022  9:07 AM    PCP:   ROLANDO Shrestha CNP  Code Status:  Full Code    Subjective:     C/C:   Chief Complaint   Patient presents with    Shortness of Breath     Onset 2 hours, PTA. Interval History Status:   Feeling  improvement after coming to hospital  MRI thoracic spine showed chronic compression fracture changes, no  surgical intervention recommended by orthopedics  Breathing better  Oral Vitamin D ordered for levels low-17.7  Brief History:   Ganesh Delgadillo is a 78 y.o. Non- / non  male who presents with Shortness of Breath (Onset 2 hours, PTA. )   and is admitted to the hospital for the management of Pneumonia.     Patient reports he has progressive dyspnea for the last couple of months. This morning however, he woke up and felt he couldn't breathe. He also endorses non-productive cough and chest congestion.  He says one of the caregivers [facility]at the [facility] where [facility] was just diagnosed with COVID. He reports he has had diarrhea, decreased appetite. He has had no fever, chills, or chest pain. He has chronic neuropathy in his hands and legs- uses a wheelchair to mobilize, although he says he is able to walk short distances. He is noted to have a PMH of bipolar disorder, schizophrenia, HTN, and GERD. He called EMS who brought him to the ED for evaluation.      While in the ED, he required bipap. He told the ED provider he has been vaccinated against COVID. He had a low grade temp and was tachycardic and tachypneic upon arrival. WBC elevated at 19.4, trop sl elevated. COVID swab was negative. He was given IV Rocephin and IV Zithromax while in the ED. CT of the chest revealed thoracic spine compression deformities, emphysema, and left upper lobe pneumonia.     He was able to be weaned from the bipap and placed on nasal cannula prior to going to the progressive unit. He is being admitted for further observation and management of pneumonia and hypoxia      Review of Systems:     Constitutional:  negative for chills, fevers, sweats  Respiratory: shortness of breath has improved, denies wheezing  Cardiovascular:  negative for chest pain, chest pressure/discomfort, lower extremity edema, palpitations  Gastrointestinal:  negative for abdominal pain, constipation, diarrhea, nausea, vomiting  Neurological:  negative for dizziness, headache    Medications: Allergies:     Allergies   Allergen Reactions    Depakote [Divalproex Sodium]     Geodon [Ziprasidone Hcl]     Haldol [Haloperidol Lactate]     Stelazine [Trifluoperazine]     Trazodone Other (See Comments)     Urinary incontinence     Valproic Acid      Other reaction(s): Liver Abnormalities  Jaundice        Current Meds:   Scheduled Meds:    vitamin D  50,000 Units Oral Weekly    topiramate  200 mg Oral Nightly    OLANZapine  10 mg Oral Nightly    OLANZapine  5 mg Oral Daily with breakfast    atorvastatin  10 mg Oral Nightly    FLUoxetine  20 mg Oral Daily with breakfast    pregabalin  50 mg Oral BID    budesonide-formoterol  2 puff Inhalation BID    tamsulosin  0.4 mg Oral Daily    sodium chloride flush  5-40 mL IntraVENous 2 times per day    enoxaparin  30 mg SubCUTAneous BID    cefTRIAXone (ROCEPHIN) IV  1,000 mg IntraVENous Q24H    guaiFENesin  600 mg Oral BID    pantoprazole  40 mg Oral QAM AC     Continuous Infusions:    sodium chloride 75 mL/hr at 22 0555    sodium chloride       PRN Meds: HYDROcodone 5 mg - acetaminophen, potassium chloride **OR** potassium alternative oral replacement **OR** potassium chloride, ipratropium-albuterol, hydrOXYzine, sodium chloride flush, sodium chloride, ondansetron **OR** ondansetron, magnesium hydroxide, acetaminophen **OR** acetaminophen, albuterol    Data:     Past Medical History:   has a past medical history of Bipolar disorder ([facility] GERD (gastroesophageal reflux disease), Headache, Hiatal hernia, Hypertension, and Schizophrenia ([facility] Social History:   reports that he has quit smoking. His smoking use included cigarettes. He smoked 1.50 packs per day. He has never used smokeless tobacco. He reports that he does not drink alcohol and does not use drugs. Family History:   Family History   Problem Relation Age of Onset    Stroke Mother     Alzheimer's Disease Mother     Alcohol Abuse Father     Other Father         vascular disease    Other Sister         Nervous breakdown       Vitals:  /85   Pulse 74   Temp 97.5 °F (36.4 °C) (Oral)   Resp 18   Ht 6' 3\" (1.905 m)   Wt (!) 311 lb 2 oz (141.1 kg)   SpO2 94%   BMI 38.89 kg/m²   Temp (24hrs), Av.3 °F (36.8 °C), Min:97.5 °F (36.4 °C), Max:100.4 °F (38 °C)    No results for input(s): POCGLU in the last 72 hours. I/O (24Hr):     Intake/Output Summary (Last 24 hours) at 2022 1245  Last data filed at 2022 0555  Gross per 24 hour   Intake 1640.18 ml Output 1125 ml   Net 515.18 ml       Labs:  Hematology:  No results for input(s): WBC, RBC, HGB, HCT, MCV, MCH, MCHC, RDW, PLT, MPV, SEDRATE, CRP, INR, DDIMER, MX8HYLJY, LABABSO in the last 72 hours. Invalid input(s): PT  Chemistry:  No results for input(s): NA, K, CL, CO2, GLUCOSE, BUN, CREATININE, MG, ANIONGAP, LABGLOM, GFRAA, CALCIUM, CAION, PHOS, PSA, PROBNP, TROPHS, CKTOTAL, CKMB, CKMBINDEX, MYOGLOBIN, DIGOXIN, LACTACIDWB in the last 72 hours. No results for input(s): PROT, LABALBU, LABA1C, U1GEJRX, D7WYHWA, FT4, TSH, AST, ALT, LDH, GGT, ALKPHOS, LABGGT, BILITOT, BILIDIR, AMMONIA, AMYLASE, LIPASE, LACTATE, CHOL, HDL, LDLCHOLESTEROL, CHOLHDLRATIO, TRIG, VLDL, DHW55CZ, PHENYTOIN, PHENYF, URICACID, POCGLU in the last 72 hours. ABG:No results found for: POCPH, PHART, PH, POCPCO2, JYG1DWM, PCO2, POCPO2, PO2ART, PO2, POCHCO3, XHE5WCF, HCO3, NBEA, PBEA, BEART, BE, THGBART, THB, XLH1RAU, FRLW6ROL, I4OHLGEY, O2SAT, FIO2  Lab Results   Component Value Date/Time    SPECIAL LEFT HAND 2 CC 01/03/2022 09:37 AM     Lab Results   Component Value Date/Time    CULTURE NO GROWTH 4 DAYS 01/03/2022 09:37 AM       Radiology:  XR CHEST PORTABLE    Result Date: 1/4/2022  Increasing perihilar and ground-glass airspace opacities bilaterally. Pattern may represent worsening edema or pneumonitis. XR CHEST PORTABLE    Result Date: 1/3/2022  1. Mild scattered interstitial thickening, increased when compared to 06/07/2018, possibly interstitial edema, atypical/viral pneumonia, or fibrotic change. 2. Blunting at the right costophrenic angle, which could be related to a small pleural effusion or pleural scarring. CT CHEST PULMONARY EMBOLISM W CONTRAST    Result Date: 1/3/2022  1. No clear evidence for central pulmonary embolus. 2. Mild dependent atelectasis and respiratory motion throughout the lungs. Moderate emphysema. Mild left upper lobe infiltrate/probable pneumonia. Follow-up is recommended to document resolution.  3. Cholelithiasis. Fatty liver. 4. 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 5. Age-indeterminate compression deformities of T2, T3, T5, T7 and T11. Consider further evaluation with MRI thoracic spine to assess for acuity/marrow edema. 6. Atherosclerotic calcification of the aorta and branch vasculature. Coronary artery disease. Cardiomegaly.  RECOMMENDATIONS: Unavailable       Physical Examination:        General appearance:  alert, cooperative and no distress  Mental Status:  oriented to person, place and time and normal affect  Lungs:  + Surgical scar on back of right chest, diminished breath sounds at bases, no wheezing   Heart:  regular rate and rhythm, no murmur  Abdomen:  soft, nontender, nondistended, normal bowel sounds, no masses, hepatomegaly, splenomegaly  Extremities:  + edema, no redness, tenderness in the calves, has neuropathy of both hands and feet  Skin:  no gross lesions, rashes, induration    Assessment:        Hospital Problems           Last Modified POA    * (Principal) Pneumonia 1/3/2022 Yes    Schizoaffective disorder ([facility] 1/6/2022 Yes    Gastroesophageal reflux disease 1/3/2022 Yes    Hypovitaminosis D 1/6/2022 Yes    Primary hypertension 1/3/2022 Yes    Hypoxia 1/3/2022 Yes    Neuropathy 1/3/2022 Yes    Pathological fracture of thoracic vertebra due to secondary osteoporosis ([address] 1/6/2022 Yes    Age-related osteoporosis without current pathological fracture 1/6/2022 Yes    SIRS (systemic inflammatory response syndrome) ([address] 1/6/2022 Yes    Lumbar stenosis without neurogenic claudication 1/6/2022 Yes    Thoracic compression fracture [facility]) T 2-T11 1/6/2022 Yes          Plan:        Continue IV antibiotics for pneumonia, will switch to oral at discharge  No surgical intervention for thoracic compression deformities per orthopedics  Replace vitamin D orally  PT OT  Being evaluated for going to rehab      Leslie Hu MD  1/7/2022  12:45 PM

## 2022-01-08 LAB
CULTURE: NORMAL
CULTURE: NORMAL
Lab: NORMAL
Lab: NORMAL
SPECIMEN DESCRIPTION: NORMAL
SPECIMEN DESCRIPTION: NORMAL

## 2023-04-08 ENCOUNTER — APPOINTMENT (OUTPATIENT)
Dept: GENERAL RADIOLOGY | Age: 68
End: 2023-04-08
Payer: COMMERCIAL

## 2023-04-08 ENCOUNTER — APPOINTMENT (OUTPATIENT)
Dept: CT IMAGING | Age: 68
End: 2023-04-08
Payer: COMMERCIAL

## 2023-04-08 ENCOUNTER — HOSPITAL ENCOUNTER (EMERGENCY)
Age: 68
Discharge: HOME | End: 2023-04-09
Attending: STUDENT IN AN ORGANIZED HEALTH CARE EDUCATION/TRAINING PROGRAM
Payer: COMMERCIAL

## 2023-04-08 VITALS
WEIGHT: 315 LBS | DIASTOLIC BLOOD PRESSURE: 81 MMHG | OXYGEN SATURATION: 91 % | BODY MASS INDEX: 38.36 KG/M2 | RESPIRATION RATE: 25 BRPM | TEMPERATURE: 99.3 F | HEIGHT: 76 IN | HEART RATE: 85 BPM | SYSTOLIC BLOOD PRESSURE: 128 MMHG

## 2023-04-08 DIAGNOSIS — R11.2 NAUSEA VOMITING AND DIARRHEA: Primary | ICD-10-CM

## 2023-04-08 DIAGNOSIS — R19.7 NAUSEA VOMITING AND DIARRHEA: Primary | ICD-10-CM

## 2023-04-08 LAB
ABSOLUTE EOS #: 0.04 K/UL (ref 0–0.44)
ABSOLUTE IMMATURE GRANULOCYTE: 0.03 K/UL (ref 0–0.3)
ABSOLUTE LYMPH #: 0.86 K/UL (ref 1.1–3.7)
ABSOLUTE MONO #: 0.78 K/UL (ref 0.1–1.2)
ALBUMIN SERPL-MCNC: 4.1 G/DL (ref 3.5–5.2)
ALP SERPL-CCNC: 72 U/L (ref 40–129)
ALT SERPL-CCNC: 27 U/L (ref 5–41)
ANION GAP SERPL CALCULATED.3IONS-SCNC: 14 MMOL/L (ref 9–17)
AST SERPL-CCNC: 23 U/L
BASOPHILS # BLD: 0 % (ref 0–2)
BASOPHILS ABSOLUTE: 0.03 K/UL (ref 0–0.2)
BILIRUB SERPL-MCNC: 0.7 MG/DL (ref 0.3–1.2)
BNP SERPL-MCNC: 180 PG/ML
BUN SERPL-MCNC: 18 MG/DL (ref 8–23)
BUN/CREAT BLD: 16 (ref 9–20)
CALCIUM SERPL-MCNC: 9 MG/DL (ref 8.6–10.4)
CHLORIDE SERPL-SCNC: 100 MMOL/L (ref 98–107)
CO2 SERPL-SCNC: 24 MMOL/L (ref 20–31)
CREAT SERPL-MCNC: 1.14 MG/DL (ref 0.7–1.2)
EOSINOPHILS RELATIVE PERCENT: 0 % (ref 1–4)
FLUAV AG SPEC QL: NEGATIVE
FLUBV AG SPEC QL: NEGATIVE
GFR SERPL CREATININE-BSD FRML MDRD: >60 ML/MIN/1.73M2
GLUCOSE SERPL-MCNC: 128 MG/DL (ref 70–99)
HCT VFR BLD AUTO: 49.1 % (ref 40.7–50.3)
HGB BLD-MCNC: 15.6 G/DL (ref 13–17)
IMMATURE GRANULOCYTES: 0 %
INR PPP: 1.1
LACTIC ACID, SEPSIS: 2.4 MMOL/L (ref 0.5–1.9)
LYMPHOCYTES # BLD: 8 % (ref 24–43)
MCH RBC QN AUTO: 29 PG (ref 25.2–33.5)
MCHC RBC AUTO-ENTMCNC: 31.8 G/DL (ref 28.4–34.8)
MCV RBC AUTO: 91.3 FL (ref 82.6–102.9)
MONOCYTES # BLD: 7 % (ref 3–12)
NRBC AUTOMATED: 0 PER 100 WBC
PARTIAL THROMBOPLASTIN TIME: 31.9 SEC (ref 23.9–33.8)
PDW BLD-RTO: 12.9 % (ref 11.8–14.4)
PLATELET # BLD AUTO: 350 K/UL (ref 138–453)
PMV BLD AUTO: 10.7 FL (ref 8.1–13.5)
POTASSIUM SERPL-SCNC: 4.2 MMOL/L (ref 3.7–5.3)
PROT SERPL-MCNC: 8 G/DL (ref 6.4–8.3)
PROTHROMBIN TIME: 14.3 SEC (ref 11.5–14.2)
RBC # BLD: 5.38 M/UL (ref 4.21–5.77)
SARS-COV-2 RDRP RESP QL NAA+PROBE: NOT DETECTED
SEG NEUTROPHILS: 85 % (ref 36–65)
SEGMENTED NEUTROPHILS ABSOLUTE COUNT: 9.01 K/UL (ref 1.5–8.1)
SODIUM SERPL-SCNC: 138 MMOL/L (ref 135–144)
SPECIMEN DESCRIPTION: NORMAL
TROPONIN I SERPL DL<=0.01 NG/ML-MCNC: 24 NG/L (ref 0–22)
WBC # BLD AUTO: 10.8 K/UL (ref 3.5–11.3)

## 2023-04-08 PROCEDURE — 2580000003 HC RX 258: Performed by: STUDENT IN AN ORGANIZED HEALTH CARE EDUCATION/TRAINING PROGRAM

## 2023-04-08 PROCEDURE — 87804 INFLUENZA ASSAY W/OPTIC: CPT

## 2023-04-08 PROCEDURE — 83880 ASSAY OF NATRIURETIC PEPTIDE: CPT

## 2023-04-08 PROCEDURE — 85610 PROTHROMBIN TIME: CPT

## 2023-04-08 PROCEDURE — 83605 ASSAY OF LACTIC ACID: CPT

## 2023-04-08 PROCEDURE — 85025 COMPLETE CBC W/AUTO DIFF WBC: CPT

## 2023-04-08 PROCEDURE — 80053 COMPREHEN METABOLIC PANEL: CPT

## 2023-04-08 PROCEDURE — 87635 SARS-COV-2 COVID-19 AMP PRB: CPT

## 2023-04-08 PROCEDURE — 71260 CT THORAX DX C+: CPT | Performed by: STUDENT IN AN ORGANIZED HEALTH CARE EDUCATION/TRAINING PROGRAM

## 2023-04-08 PROCEDURE — 85730 THROMBOPLASTIN TIME PARTIAL: CPT

## 2023-04-08 PROCEDURE — 84145 PROCALCITONIN (PCT): CPT

## 2023-04-08 PROCEDURE — 6360000004 HC RX CONTRAST MEDICATION: Performed by: STUDENT IN AN ORGANIZED HEALTH CARE EDUCATION/TRAINING PROGRAM

## 2023-04-08 PROCEDURE — 84484 ASSAY OF TROPONIN QUANT: CPT

## 2023-04-08 PROCEDURE — 6370000000 HC RX 637 (ALT 250 FOR IP): Performed by: STUDENT IN AN ORGANIZED HEALTH CARE EDUCATION/TRAINING PROGRAM

## 2023-04-08 PROCEDURE — 71045 X-RAY EXAM CHEST 1 VIEW: CPT

## 2023-04-08 RX ORDER — ONDANSETRON 4 MG/1
4 TABLET, ORALLY DISINTEGRATING ORAL 3 TIMES DAILY PRN
Qty: 21 TABLET | Refills: 0 | Status: SHIPPED | OUTPATIENT
Start: 2023-04-08 | End: 2023-04-08 | Stop reason: SDUPTHER

## 2023-04-08 RX ORDER — 0.9 % SODIUM CHLORIDE 0.9 %
80 INTRAVENOUS SOLUTION INTRAVENOUS ONCE
Status: COMPLETED | OUTPATIENT
Start: 2023-04-08 | End: 2023-04-08

## 2023-04-08 RX ORDER — ONDANSETRON 4 MG/1
4 TABLET, ORALLY DISINTEGRATING ORAL 3 TIMES DAILY PRN
Qty: 21 TABLET | Refills: 0 | Status: SHIPPED | OUTPATIENT
Start: 2023-04-08

## 2023-04-08 RX ORDER — DICYCLOMINE HYDROCHLORIDE 10 MG/1
10 CAPSULE ORAL ONCE
Status: COMPLETED | OUTPATIENT
Start: 2023-04-08 | End: 2023-04-08

## 2023-04-08 RX ORDER — SODIUM CHLORIDE 0.9 % (FLUSH) 0.9 %
10 SYRINGE (ML) INJECTION PRN
Status: DISCONTINUED | OUTPATIENT
Start: 2023-04-08 | End: 2023-04-09 | Stop reason: HOSPADM

## 2023-04-08 RX ORDER — DICYCLOMINE HCL 20 MG
20 TABLET ORAL 3 TIMES DAILY PRN
Qty: 30 TABLET | Refills: 0 | Status: SHIPPED | OUTPATIENT
Start: 2023-04-08

## 2023-04-08 RX ORDER — ONDANSETRON 4 MG/1
4 TABLET, ORALLY DISINTEGRATING ORAL ONCE
Status: COMPLETED | OUTPATIENT
Start: 2023-04-08 | End: 2023-04-08

## 2023-04-08 RX ORDER — DICYCLOMINE HCL 20 MG
20 TABLET ORAL 3 TIMES DAILY PRN
Qty: 30 TABLET | Refills: 0 | Status: SHIPPED | OUTPATIENT
Start: 2023-04-08 | End: 2023-04-08 | Stop reason: SDUPTHER

## 2023-04-08 RX ADMIN — SODIUM CHLORIDE, PRESERVATIVE FREE 10 ML: 5 INJECTION INTRAVENOUS at 20:34

## 2023-04-08 RX ADMIN — ONDANSETRON 4 MG: 4 TABLET, ORALLY DISINTEGRATING ORAL at 22:52

## 2023-04-08 RX ADMIN — SODIUM CHLORIDE 80 ML: 9 INJECTION, SOLUTION INTRAVENOUS at 20:34

## 2023-04-08 RX ADMIN — DICYCLOMINE HYDROCHLORIDE 10 MG: 10 CAPSULE ORAL at 22:52

## 2023-04-08 RX ADMIN — IOPAMIDOL 75 ML: 755 INJECTION, SOLUTION INTRAVENOUS at 20:34

## 2023-04-08 ASSESSMENT — PAIN - FUNCTIONAL ASSESSMENT: PAIN_FUNCTIONAL_ASSESSMENT: NONE - DENIES PAIN

## 2023-04-08 NOTE — ED TRIAGE NOTES
Pt reports to ED from assisted living. Pt reports that he developed some nausea, emesis and diarrhea just before breakfast. Pt reports that he was tested for covid but no results have been back yet. Pt reports an episode of dizziness earlier in the morning but currently denies. Pt reports that assisted living sent him in but did not want to come in himself.

## 2023-04-08 NOTE — ED PROVIDER NOTES
Right     benign tumor removed    THORACOTOMY      for a chest tumor    TONSILLECTOMY         CURRENT MEDICATIONS       Discharge Medication List as of 4/8/2023 10:46 PM        CONTINUE these medications which have NOT CHANGED    Details   vitamin D (ERGOCALCIFEROL) 1.25 MG (46788 UT) CAPS capsule Take 1 capsule by mouth once a week, Disp-5 capsule, R-1Normal      fluticasone-salmeterol (ADVAIR) 250-50 MCG/DOSE AEPB Inhale 1 puff into the lungs 2 times dailyHistorical Med      atorvastatin (LIPITOR) 10 MG tablet Take 10 mg by mouth nightlyHistorical Med      cyclobenzaprine (FLEXERIL) 10 MG tablet Take 10 mg by mouth 3 times daily as needed (LEG CRAMPS)Historical Med      oxybutynin (DITROPAN) 5 MG tablet Take 5 mg by mouth dailyHistorical Med      bisacodyl (DULCOLAX) 10 MG suppository Place 10 mg rectally daily as needed for Constipation (IF MILK OF MAGNESIA IS INEFFECTIVE.)Historical Med      FLUoxetine (PROZAC) 20 MG capsule Take 20 mg by mouth dailyHistorical Med      furosemide (LASIX) 40 MG tablet Take 40 mg by mouth dailyHistorical Med      hydrOXYzine (ATARAX) 25 MG tablet Take 25 mg by mouth 2 times daily as needed for AnxietyHistorical Med      pregabalin (LYRICA) 50 MG capsule Take 50 mg by mouth 2 times daily. Historical Med      magnesium hydroxide (MILK OF MAGNESIA) 400 MG/5ML suspension Take 15 mLs by mouth daily as needed for ConstipationHistorical Med      !! OLANZapine (ZYPREXA) 5 MG tablet Take 5 mg by mouth daily (with breakfast)Historical Med      !! OLANZapine (ZYPREXA) 10 MG tablet Take 10 mg by mouth nightlyHistorical Med      topiramate (TOPAMAX) 200 MG tablet Take 200 mg by mouth nightlyHistorical Med      tamsulosin (FLOMAX) 0.4 MG capsule Take 1 capsule by mouth daily, Disp-30 capsule, R-3Normal       !! - Potential duplicate medications found. Please discuss with provider.           ALLERGIES     is allergic to depakote [divalproex sodium], geodon [ziprasidone hcl], haldol [haloperidol

## 2023-04-08 NOTE — ED NOTES
Xray at bedside       [facility], [address] [place] [address]  04/08/23 5427

## 2023-04-09 LAB
EKG ATRIAL RATE: 100 BPM
EKG P AXIS: 38 DEGREES
EKG P-R INTERVAL: 174 MS
EKG Q-T INTERVAL: 338 MS
EKG QRS DURATION: 82 MS
EKG QTC CALCULATION (BAZETT): 436 MS
EKG R AXIS: 42 DEGREES
EKG T AXIS: 64 DEGREES
EKG VENTRICULAR RATE: 100 BPM
PROCALCITONIN SERPL-MCNC: 0.12 NG/ML

## 2023-04-09 NOTE — ED NOTES
Pt to ct via stretcher in stable condition  Will continue to monitor.       [facility]  04/08/23 2034

## 2023-04-09 NOTE — ED NOTES
Pt to ed via ems for N/V/D since this morning. Pt to ed from [facility]. Pt states he did not wish to come to ed but staff wanted him to come and be evaluated. Pt states he was tested for covid this morning at facility but doesn't know results  Pt a/o x4  Call light in reach  Bed locked and in lowest position. Will continue to monitor.       Valery [facility]  04/08/23 2038

## 2023-04-09 NOTE — DISCHARGE INSTRUCTIONS
[facility] to [facility]'s [facility]. Take your medication as indicated and prescribed. Avoid drinking alcohol or drinks that have caffeine it. Drink plenty of water or fluids like Gatorade to keep yourself hydrated. You should eat bland foods like bananas, rice, apple sauce and toast / crackers. PLEASE RETURN TO THE EMERGENCY DEPARTMENT IMMEDIATELY for worsening symptoms, increase in the amount of diarrhea you are having, abdominal pain, blood in your stool, vomiting up blood, persistent nausea and/or vomiting, or if you develop any concerning symptoms such as: high fever not relieved by acetaminophen (Tylenol) and/or ibuprofen (Motrin / Advil), chills, shortness of breath, chest pain, feeling of your heart fluttering or racing, loss of consciousness, numbness, weakness or tingling in the arms or legs or change in color of the extremities, changes in mental status, persistent headache, blurry vision, loss of bladder / bowel control, unable to follow up with your physician, or other any other care or concern.